# Patient Record
Sex: MALE | Race: BLACK OR AFRICAN AMERICAN | NOT HISPANIC OR LATINO | Employment: FULL TIME | URBAN - METROPOLITAN AREA
[De-identification: names, ages, dates, MRNs, and addresses within clinical notes are randomized per-mention and may not be internally consistent; named-entity substitution may affect disease eponyms.]

---

## 2017-04-03 ENCOUNTER — HOSPITAL ENCOUNTER (EMERGENCY)
Facility: HOSPITAL | Age: 32
Discharge: HOME | End: 2017-04-03
Admitting: EMERGENCY MEDICINE

## 2017-04-03 VITALS
WEIGHT: 107 LBS | RESPIRATION RATE: 16 BRPM | HEART RATE: 93 BPM | OXYGEN SATURATION: 98 % | DIASTOLIC BLOOD PRESSURE: 88 MMHG | HEIGHT: 67 IN | TEMPERATURE: 98.4 F | SYSTOLIC BLOOD PRESSURE: 128 MMHG | BODY MASS INDEX: 16.79 KG/M2

## 2017-04-03 DIAGNOSIS — S86.012A RUPTURE OF LEFT ACHILLES TENDON, INITIAL ENCOUNTER: Primary | ICD-10-CM

## 2017-04-03 PROCEDURE — 99283 EMERGENCY DEPT VISIT LOW MDM: CPT

## 2017-04-03 PROCEDURE — 99282 EMERGENCY DEPT VISIT SF MDM: CPT | Performed by: NURSE PRACTITIONER

## 2017-04-03 RX ORDER — HYDROCODONE BITARTRATE AND ACETAMINOPHEN 5; 325 MG/1; MG/1
1 TABLET ORAL ONCE
Status: COMPLETED | OUTPATIENT
Start: 2017-04-03 | End: 2017-04-03

## 2017-04-03 RX ORDER — HYDROCODONE BITARTRATE AND ACETAMINOPHEN 5; 325 MG/1; MG/1
1 TABLET ORAL EVERY 4 HOURS PRN
Qty: 20 TABLET | Refills: 0 | Status: SHIPPED | OUTPATIENT
Start: 2017-04-03 | End: 2020-11-13

## 2017-04-03 RX ADMIN — HYDROCODONE BITARTRATE AND ACETAMINOPHEN 1 TABLET: 5; 325 TABLET ORAL at 22:23

## 2017-04-04 NOTE — ED PROVIDER NOTES
Subjective   Patient is a 31 y.o. male presenting with lower extremity pain.   History provided by:  Patient   used: No    Lower Extremity Issue   Lower extremity pain location: Left Achilles tendon.  Time since incident:  2 hours  Pain details:     Quality:  Unable to specify    Radiates to:  Does not radiate    Severity:  Moderate    Onset quality:  Sudden    Timing:  Constant    Progression:  Unchanged  Chronicity:  New  Dislocation: no    Foreign body present:  No foreign bodies  Prior injury to area:  No  Relieved by:  Rest  Worsened by:  Bearing weight and activity  Ineffective treatments:  None tried  Associated symptoms: decreased ROM    Associated symptoms: no fever and no swelling        Review of Systems   Constitutional: Positive for activity change. Negative for appetite change, chills, diaphoresis and fever.   HENT: Negative for sore throat and trouble swallowing.    Respiratory: Negative for chest tightness, shortness of breath, wheezing and stridor.    Gastrointestinal: Negative for diarrhea, nausea and vomiting.   Musculoskeletal: Positive for gait problem and myalgias.        Achilles tendon pain   Skin: Negative for color change and rash.   Neurological: Negative for dizziness and headaches.       History reviewed. No pertinent past medical history.    No Known Allergies    Past Surgical History:   Procedure Laterality Date   • ACHILLES TENDON SURGERY         History reviewed. No pertinent family history.    Social History     Social History   • Marital status: Single     Spouse name: N/A   • Number of children: N/A   • Years of education: N/A     Social History Main Topics   • Smoking status: Current Every Day Smoker     Types: Cigars   • Smokeless tobacco: None   • Alcohol use Yes      Comment: socially   • Drug use: None   • Sexual activity: Not Asked     Other Topics Concern   • None     Social History Narrative   • None           Objective   Physical Exam   Constitutional:  He is oriented to person, place, and time. He appears well-developed and well-nourished. No distress.   HENT:   Head: Normocephalic and atraumatic.   Right Ear: External ear normal.   Left Ear: External ear normal.   Nose: Nose normal.   Mouth/Throat: Oropharynx is clear and moist.   Eyes: Conjunctivae are normal. Pupils are equal, round, and reactive to light.   Neck: Neck supple. No tracheal deviation present. No thyromegaly present.   Cardiovascular: Normal rate, regular rhythm, normal heart sounds and intact distal pulses.  Exam reveals no gallop.    No murmur heard.  Pulmonary/Chest: Effort normal and breath sounds normal. No respiratory distress. He has no wheezes. He has no rales.   Abdominal: Soft. Bowel sounds are normal. He exhibits no distension and no mass. There is no tenderness. There is no rebound.   Musculoskeletal:   Tender over the left Achilles tendon.  Flexion-extension of the foot demonstrates the tendon is under just inferior to the gastroc muscle.  Kaufman's test was positive   Lymphadenopathy:     He has no cervical adenopathy.   Neurological: He is alert and oriented to person, place, and time.   Skin: Skin is warm and dry. He is not diaphoretic. No erythema. No pallor.   Psychiatric: He has a normal mood and affect. Judgment and thought content normal.   Nursing note and vitals reviewed.      Procedures         ED Course  ED Course   Comment By Time   Patient presents the ED with complaint of left Achilles tendon pain.  As he heard it pop while he was playing basketball this evening.  Has a history of having a ruptured the right Achilles tendon years ago. NEEMA Boyer 04/03 [id number]                  [facility]    Final diagnoses:   Rupture of left Achilles tendon, initial encounter            NEEMA Boyer  04/03/17 0717

## 2017-04-05 ENCOUNTER — OFFICE VISIT (OUTPATIENT)
Dept: ORTHOPEDIC SURGERY | Facility: CLINIC | Age: 32
End: 2017-04-05

## 2017-04-05 VITALS
SYSTOLIC BLOOD PRESSURE: 145 MMHG | BODY MASS INDEX: 26.68 KG/M2 | WEIGHT: 170 LBS | HEIGHT: 67 IN | DIASTOLIC BLOOD PRESSURE: 85 MMHG | HEART RATE: 69 BPM

## 2017-04-05 DIAGNOSIS — R52 PAIN: Primary | ICD-10-CM

## 2017-04-05 DIAGNOSIS — S86.012A ACHILLES TENDON TEAR, LEFT, INITIAL ENCOUNTER: ICD-10-CM

## 2017-04-05 PROBLEM — S86.019A ACHILLES TENDON TEAR: Status: ACTIVE | Noted: 2017-04-05

## 2017-04-05 PROCEDURE — 73610 X-RAY EXAM OF ANKLE: CPT | Performed by: ORTHOPAEDIC SURGERY

## 2017-04-05 PROCEDURE — 99204 OFFICE O/P NEW MOD 45 MIN: CPT | Performed by: ORTHOPAEDIC SURGERY

## 2017-04-05 RX ORDER — MELOXICAM 7.5 MG/1
7.5 TABLET ORAL DAILY
Qty: 30 TABLET | Refills: 0 | Status: SHIPPED | OUTPATIENT
Start: 2017-04-05 | End: 2020-11-13

## 2017-04-05 RX ORDER — PREGABALIN 25 MG/1
75 CAPSULE ORAL ONCE
Status: CANCELLED | OUTPATIENT
Start: 2017-04-05 | End: 2017-04-05

## 2017-04-05 RX ORDER — OXYCODONE HCL 10 MG/1
10 TABLET, FILM COATED, EXTENDED RELEASE ORAL ONCE
Status: CANCELLED | OUTPATIENT
Start: 2017-04-05 | End: 2017-04-05

## 2017-04-05 RX ORDER — CELECOXIB 100 MG/1
200 CAPSULE ORAL ONCE
Status: CANCELLED | OUTPATIENT
Start: 2017-04-05 | End: 2017-04-05

## 2017-04-05 NOTE — PROGRESS NOTES
Subjective:     Patient ID: Rome Sanchez is a 31 y.o. male.    Chief Complaint:  Left ankle pain  History of Present Illness  Rome Sanchez presents to clinic today for evaluation of left ankle pain and weakness, started after he was playing basketball on April 3, 2017, felt a pop when he went to jump localized the posterior aspect of his left ankle.  Noted immediate onset sharp pain, since it is had moderate aching pain rates as a 7 out of 10.  He was seen in emergency department and diagnosed with an Achilles tendon rupture and recommended for follow-up here.  Denies numbness or tingling left lower extremity.  Using a boot with moderate improvement of pain as well as with elevation.  Using crutches regulation.  Did have significant history of Achilles rupture on the right with repair in 2004, postoperative complication with a wound infection treated with antibiotics.       Social History     Occupational History   • Not on file.     Social History Main Topics   • Smoking status: Current Every Day Smoker     Types: Cigars   • Smokeless tobacco: Never Used   • Alcohol use Yes      Comment: socially   • Drug use: No   • Sexual activity: Defer      History reviewed. No pertinent past medical history.  Past Surgical History:   Procedure Laterality Date   • ACHILLES TENDON SURGERY Right 02/2004       History reviewed. No pertinent family history.      Review of Systems   Constitutional: Positive for chills. Negative for diaphoresis, fever and unexpected weight change.   HENT: Negative for hearing loss, nosebleeds, sore throat and tinnitus.    Eyes: Negative for pain and visual disturbance.   Respiratory: Negative for cough, shortness of breath and wheezing.    Cardiovascular: Negative for chest pain and palpitations.   Gastrointestinal: Negative for abdominal pain, diarrhea, nausea and vomiting.   Endocrine: Negative for cold intolerance, heat intolerance and polydipsia.   Genitourinary: Negative for difficulty  "urinating, dysuria and hematuria.   Musculoskeletal: Positive for joint swelling and myalgias. Negative for arthralgias.   Skin: Negative for rash and wound.   Allergic/Immunologic: Negative for environmental allergies.   Neurological: Negative for dizziness, syncope and numbness.   Hematological: Does not bruise/bleed easily.   Psychiatric/Behavioral: Negative for dysphoric mood and sleep disturbance. The patient is not nervous/anxious.            Objective:  Vitals:    04/05/17 0918   BP: 145/85   BP Location: Left arm   Pulse: 69   Weight: 170 lb (77.1 kg)   Height: 67\" (170.2 cm)     Last 2 weights    04/05/17 0918   Weight: 170 lb (77.1 kg)     Body mass index is 26.63 kg/(m^2).  Physical Exam    Vital signs reviewed.   General: No acute distress.  Eyes: conjunctiva clear; pupils equally round and reactive  ENT: external ears and nose atraumatic; oropharynx clear  CV: no peripheral edema  Resp: normal respiratory effort  Skin: no rashes or wounds; normal turgor  Psych: mood and affect appropriate; recent and remote memory intact       Ortho Exam    Left ankle-palpable defect over the Achilles tendon noted with maximal tenderness palpation in this region, approximately 6 cm above the insertion of the Achilles onto the calcaneus.  Positive Kaufman test, no active plantarflexion appreciated.  Patient is able to dorsiflex to 5°, limited secondary to pain.  Positive flexion extension all toes left foot with 4 out of 5 strength.  Positive sensation light touch medial, lateral, plantar, dorsal first with his left foot symmetric to the right.  Moderate calf Pain, no significant calf swelling.  Brisk cap refill all digits, 2+ dorsalis pedis pulse left foot    Imaging:  Left Ankle X-Ray  Indication: Pain  Views: AP, Lateral, Mortise  Findings:  No fracture  No bony lesion  Soft tissues with shadowing suggesting Achilles tendon rupture on lateral view, no evidence of bony avulsion  Normal joint spaces with mortise " well-aligned, no evidence of syndesmosis widening    No prior studies available for comparison.      Assessment:       1. Pain    2. Achilles tendon tear, left, initial encounter          Plan:  SAUL query complete.          Discussed treatment options at length with patient at today's visit. Given his desire to return to activities and prior success with Achilles tendon repair on the right he would like to proceed with Achilles tendon repair on the left.  We did discuss possibility for nonoperative treatment and noted limitation with that including a slightly higher risk of rerupture but also noted complications possible with surgical treatment including wound infection as he did have on the right.  In light of these risks, he still wishes to proceed with surgical treatment at this time.    We will plan on proceeding with surgery at patient's request for left Achilles tendon repair and all associated procedures. I reviewed details of procedure with patient today and discussed risks, benefits, alternatives, and limitations of the procedure in laymen's terms with the risks including but not limited to: neurovascular damage, bleeding, infection, wound dehiscence, chronic pain, worsening of pain, re-rupture of Achilles, swelling, loss of motion, weakness, stiffness, instability, DVT, pulmonary embolus, death, stroke, complex regional pain syndrome, and need for additional procedures. No guarantees were given regarding results of surgery.      Patient was given the opportunity to ask and have all questions answered today. The patient voiced understanding of the risks, benefits, and alternative forms of treatment that were discussed and the patient consents to proceed with surgery.         Rome Sanchez was in agreement with plan and had all questions answered.     Orders:  Orders Placed This Encounter   Procedures   • XR Ankle 3+ View Left   • Basic metabolic panel   • Protime-INR   • APTT       Medications:  New  Medications Ordered This Visit   Medications   • meloxicam (MOBIC) 7.5 MG tablet     Sig: Take 1 tablet by mouth Daily.     Dispense:  30 tablet     Refill:  0       Followup:  No Follow-up on file.          Dragon transcription disclaimer     Much of this encounter note is an electronic transcription/translation of spoken language to printed text. The electronic translation of spoken language may permit erroneous, or at times, nonsensical words or phrases to be inadvertently transcribed. Although I have reviewed the note for such errors, some may still exist.

## 2017-04-07 ENCOUNTER — TELEPHONE (OUTPATIENT)
Dept: ORTHOPEDIC SURGERY | Facility: CLINIC | Age: 32
End: 2017-04-07

## 2017-04-07 RX ORDER — DICLOFENAC SODIUM 75 MG/1
75 TABLET, DELAYED RELEASE ORAL 2 TIMES DAILY
Qty: 30 TABLET | Refills: 0 | Status: SHIPPED | OUTPATIENT
Start: 2017-04-07 | End: 2020-11-13

## 2017-04-07 NOTE — TELEPHONE ENCOUNTER
Patient calling requesting an RX for pain.  Rupture of left Achilles tendon-last seen 04.05.2017 with surgery pending due to no insurance.    Thanks.

## 2020-11-13 ENCOUNTER — OFFICE VISIT (OUTPATIENT)
Dept: FAMILY MEDICINE CLINIC | Facility: CLINIC | Age: 35
End: 2020-11-13

## 2020-11-13 VITALS
HEART RATE: 70 BPM | WEIGHT: 168.8 LBS | TEMPERATURE: 98.4 F | BODY MASS INDEX: 26.49 KG/M2 | OXYGEN SATURATION: 95 % | HEIGHT: 67 IN | DIASTOLIC BLOOD PRESSURE: 93 MMHG | SYSTOLIC BLOOD PRESSURE: 149 MMHG
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
## 2020-11-13 DIAGNOSIS — S02.91XA CLOSED FRACTURE OF SKULL, UNSPECIFIED BONE, INITIAL ENCOUNTER (HCC): Primary | ICD-10-CM

## 2020-11-13 PROCEDURE — 99204 OFFICE O/P NEW MOD 45 MIN: CPT | Performed by: FAMILY MEDICINE

## 2020-11-13 RX ORDER — IBUPROFEN 600 MG/1
600 TABLET ORAL EVERY 6 HOURS PRN
COMMUNITY
End: 2020-11-13

## 2020-11-13 RX ORDER — IBUPROFEN 800 MG/1
800 TABLET ORAL EVERY 6 HOURS PRN
Qty: 50 TABLET | Refills: 1 | Status: SHIPPED | OUTPATIENT
Start: 2020-11-13 | End: 2020-12-02 | Stop reason: SDUPTHER

## 2020-11-13 RX ORDER — METHOCARBAMOL 500 MG/1
500 TABLET, FILM COATED ORAL 4 TIMES DAILY
COMMUNITY

## 2020-11-13 RX ORDER — HYDROCODONE BITARTRATE AND ACETAMINOPHEN 5; 325 MG/1; MG/1
1 TABLET ORAL EVERY 4 HOURS PRN
Qty: 12 TABLET | Refills: 0 | Status: SHIPPED | OUTPATIENT
Start: 2020-11-13
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
## 2020-11-20 ENCOUNTER — OFFICE VISIT (OUTPATIENT)
Dept: FAMILY MEDICINE CLINIC | Facility: CLINIC | Age: 35
End: 2020-11-20

## 2020-11-20 VITALS
TEMPERATURE: 97.8 F | HEART RATE: 58 BPM | RESPIRATION RATE: 16 BRPM | HEIGHT: 67 IN | SYSTOLIC BLOOD PRESSURE: 131 MMHG | WEIGHT: 173.6 LBS | BODY MASS INDEX: 27.25 KG/M2 | DIASTOLIC BLOOD PRESSURE: 83 MMHG | OXYGEN SATURATION: 100 %

## 2020-11-20 DIAGNOSIS — M89.8X1 CLAVICLE PAIN: Primary | ICD-10-CM

## 2020-11-20 DIAGNOSIS — Z48.02 VISIT FOR SUTURE REMOVAL: ICD-10-CM
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
## 2020-11-20 DIAGNOSIS — S02.91XD CLOSED FRACTURE OF SKULL WITH ROUTINE HEALING, UNSPECIFIED BONE, SUBSEQUENT ENCOUNTER: ICD-10-CM

## 2020-11-20 PROCEDURE — 99213 OFFICE O/P EST LOW 20 MIN: CPT | Performed by: FAMILY MEDICINE
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
## 2020-11-30 ENCOUNTER — TELEPHONE (OUTPATIENT)
Dept: FAMILY MEDICINE CLINIC | Facility: CLINIC | Age: 35
End: 2020-11-30

## 2020-12-02 ENCOUNTER — TELEMEDICINE (OUTPATIENT)
Dept: FAMILY MEDICINE CLINIC | Facility: CLINIC | Age: 35
End: 2020-12-02

## 2020-12-02 DIAGNOSIS — H54.7 VISION LOSS: ICD-10-CM

## 2020-12-02 DIAGNOSIS — S02.91XD CLOSED FRACTURE OF SKULL WITH ROUTINE HEALING, UNSPECIFIED BONE, SUBSEQUENT ENCOUNTER: Primary | ICD-10-CM
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
## 2020-12-02 PROCEDURE — 99213 OFFICE O/P EST LOW 20 MIN: CPT | Performed by: FAMILY MEDICINE

## 2020-12-02 RX ORDER — IBUPROFEN 800 MG/1
800 TABLET ORAL EVERY 6 HOURS PRN
Qty: 90 TABLET | Refills: 1 | Status: SHIPPED | OUTPATIENT
Start: 2020-12-02
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
## 2020-12-04 ENCOUNTER — OFFICE VISIT (OUTPATIENT)
Dept: SPORTS MEDICINE | Facility: CLINIC | Age: 35
End: 2020-12-04

## 2020-12-04 VITALS
DIASTOLIC BLOOD PRESSURE: 85 MMHG | HEART RATE: 94 BPM | OXYGEN SATURATION: 97 % | BODY MASS INDEX: 27.15 KG/M2 | HEIGHT: 67 IN | WEIGHT: 173 LBS | SYSTOLIC BLOOD PRESSURE: 128 MMHG | TEMPERATURE: 97.8 F | RESPIRATION RATE: 15 BRPM

## 2020-12-04 DIAGNOSIS — M25.512 ACUTE PAIN OF LEFT SHOULDER: ICD-10-CM

## 2020-12-04 DIAGNOSIS — S43.62XA STERNOCLAVICULAR SPRAIN, LEFT, INITIAL ENCOUNTER: ICD-10-CM
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
## 2020-12-04 PROCEDURE — 73000 X-RAY EXAM OF COLLAR BONE: CPT | Performed by: FAMILY MEDICINE

## 2020-12-04 PROCEDURE — 99244 OFF/OP CNSLTJ NEW/EST MOD 40: CPT | Performed by: FAMILY MEDICINE

## 2020-12-04 RX ORDER — CYCLOBENZAPRINE HCL 10 MG
10 TABLET ORAL 2 TIMES DAILY
COMMUNITY
Start: 2020-11-17
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
## 2023-09-29 ENCOUNTER — HOSPITAL ENCOUNTER (INPATIENT)
Facility: HOSPITAL | Age: 38
LOS: 2 days | Discharge: HOME | DRG: 440 | End: 2023-10-01
Attending: EMERGENCY MEDICINE | Admitting: INTERNAL MEDICINE

## 2023-09-29 ENCOUNTER — APPOINTMENT (OUTPATIENT)
Dept: CT IMAGING | Facility: HOSPITAL | Age: 38
DRG: 440 | End: 2023-09-29

## 2023-09-29 DIAGNOSIS — K85.20 ALCOHOL-INDUCED ACUTE PANCREATITIS, UNSPECIFIED COMPLICATION STATUS: Primary | ICD-10-CM

## 2023-09-29 PROBLEM — F10.10 ALCOHOL ABUSE: Status: ACTIVE | Noted: 2023-09-29

## 2023-09-29 PROBLEM — K85.90 ACUTE PANCREATITIS: Status: ACTIVE | Noted: 2023-09-29

## 2023-09-29 PROBLEM — F17.290 CIGAR SMOKER: Status: ACTIVE | Noted: 2023-09-29

## 2023-09-29 PROBLEM — F12.20 TETRAHYDROCANNABINOL (THC) USE DISORDER, MODERATE, DEPENDENCE: Status: ACTIVE | Noted: 2023-09-29

## 2023-09-29 LAB
ALBUMIN SERPL-MCNC: 4.2 G/DL (ref 3.5–5.2)
ALBUMIN/GLOB SERPL: 1.4 G/DL
ALP SERPL-CCNC: 61 U/L (ref 39–117)
ALT SERPL W P-5'-P-CCNC: 9 U/L (ref 1–41)
AMPHET+METHAMPHET UR QL: NEGATIVE
AMYLASE SERPL-CCNC: 123 U/L (ref 28–100)
ANION GAP SERPL CALCULATED.3IONS-SCNC: 15 MMOL/L (ref 5–15)
AST SERPL-CCNC: 28 U/L (ref 1–40)
BARBITURATES UR QL SCN: NEGATIVE
BASOPHILS # BLD AUTO: 0.02 10*3/MM3 (ref 0–0.2)
BASOPHILS NFR BLD AUTO: 0.1 % (ref 0–1.5)
BENZODIAZ UR QL SCN: NEGATIVE
BILIRUB SERPL-MCNC: 0.7 MG/DL (ref 0–1.2)
BILIRUB UR QL STRIP: NEGATIVE
BUN SERPL-MCNC: 9 MG/DL (ref 6–20)
BUN/CREAT SERPL: 7.8 (ref 7–25)
CALCIUM SPEC-SCNC: 9.6 MG/DL (ref 8.6–10.5)
CANNABINOIDS SERPL QL: POSITIVE
CHLORIDE SERPL-SCNC: 100 MMOL/L (ref 98–107)
CHOLEST SERPL-MCNC: 18 MG/DL (ref 0–200)
CLARITY UR: CLEAR
CO2 SERPL-SCNC: 21 MMOL/L (ref 22–29)
COCAINE UR QL: NEGATIVE
COLOR UR: YELLOW
CREAT SERPL-MCNC: 1.15 MG/DL (ref 0.76–1.27)
D-LACTATE SERPL-SCNC: 2 MMOL/L (ref 0.5–2)
DEPRECATED RDW RBC AUTO: 45.3 FL (ref 37–54)
EGFRCR SERPLBLD CKD-EPI 2021: 82.5 ML/MIN/1.73
EOSINOPHIL # BLD AUTO: 0.04 10*3/MM3 (ref 0–0.4)
EOSINOPHIL NFR BLD AUTO: 0.3 % (ref 0.3–6.2)
ERYTHROCYTE [DISTWIDTH] IN BLOOD BY AUTOMATED COUNT: 13.1 % (ref 12.3–15.4)
ETHANOL BLD-MCNC: <10 MG/DL (ref 0–10)
ETHANOL UR QL: <0.01 %
FENTANYL UR-MCNC: NEGATIVE NG/ML
GLOBULIN UR ELPH-MCNC: 3.1 GM/DL
GLUCOSE SERPL-MCNC: 128 MG/DL (ref 65–99)
GLUCOSE UR STRIP-MCNC: NEGATIVE MG/DL
HCT VFR BLD AUTO: 47.3 % (ref 37.5–51)
HDLC SERPL-MCNC: 5 MG/DL (ref 40–60)
HGB BLD-MCNC: 16.6 G/DL (ref 13–17.7)
HGB UR QL STRIP.AUTO: NEGATIVE
HOLD SPECIMEN: NORMAL
HOLD SPECIMEN: NORMAL
IMM GRANULOCYTES # BLD AUTO: 0.04 10*3/MM3 (ref 0–0.05)
IMM GRANULOCYTES NFR BLD AUTO: 0.3 % (ref 0–0.5)
KETONES UR QL STRIP: ABNORMAL
LDLC SERPL CALC-MCNC: <5 MG/DL (ref 0–100)
LDLC/HDLC SERPL: 1.8 {RATIO}
LEUKOCYTE ESTERASE UR QL STRIP.AUTO: NEGATIVE
LIPASE SERPL-CCNC: 205 U/L (ref 13–60)
LYMPHOCYTES # BLD AUTO: 2.14 10*3/MM3 (ref 0.7–3.1)
LYMPHOCYTES NFR BLD AUTO: 15.9 % (ref 19.6–45.3)
MAGNESIUM SERPL-MCNC: 2.1 MG/DL (ref 1.6–2.6)
MCH RBC QN AUTO: 33.3 PG (ref 26.6–33)
MCHC RBC AUTO-ENTMCNC: 35.1 G/DL (ref 31.5–35.7)
MCV RBC AUTO: 94.8 FL (ref 79–97)
METHADONE UR QL SCN: NEGATIVE
MONOCYTES # BLD AUTO: 1.07 10*3/MM3 (ref 0.1–0.9)
MONOCYTES NFR BLD AUTO: 8 % (ref 5–12)
NEUTROPHILS NFR BLD AUTO: 10.13 10*3/MM3 (ref 1.7–7)
NEUTROPHILS NFR BLD AUTO: 75.4 % (ref 42.7–76)
NITRITE UR QL STRIP: NEGATIVE
NRBC BLD AUTO-RTO: 0 /100 WBC (ref 0–0.2)
OPIATES UR QL: POSITIVE
OXYCODONE UR QL SCN: NEGATIVE
PH UR STRIP.AUTO: 8.5 [PH] (ref 5–8)
PLATELET # BLD AUTO: 186 10*3/MM3 (ref 140–450)
PMV BLD AUTO: 11.2 FL (ref 6–12)
POTASSIUM SERPL-SCNC: 4.9 MMOL/L (ref 3.5–5.2)
PROT SERPL-MCNC: 7.3 G/DL (ref 6–8.5)
PROT UR QL STRIP: ABNORMAL
RBC # BLD AUTO: 4.99 10*6/MM3 (ref 4.14–5.8)
SODIUM SERPL-SCNC: 136 MMOL/L (ref 136–145)
SP GR UR STRIP: 1.02 (ref 1–1.03)
TRIGL SERPL-MCNC: 20 MG/DL (ref 0–150)
UROBILINOGEN UR QL STRIP: ABNORMAL
VLDLC SERPL-MCNC: ABNORMAL MG/DL
WBC NRBC COR # BLD: 13.44 10*3/MM3 (ref 3.4–10.8)
WHOLE BLOOD HOLD COAG: NORMAL
WHOLE BLOOD HOLD SPECIMEN: NORMAL

## 2023-09-29 PROCEDURE — 82150 ASSAY OF AMYLASE: CPT | Performed by: NURSE PRACTITIONER

## 2023-09-29 PROCEDURE — 25010000002 ONDANSETRON PER 1 MG: Performed by: EMERGENCY MEDICINE

## 2023-09-29 PROCEDURE — 80307 DRUG TEST PRSMV CHEM ANLYZR: CPT | Performed by: PHYSICIAN ASSISTANT

## 2023-09-29 PROCEDURE — 83690 ASSAY OF LIPASE: CPT | Performed by: EMERGENCY MEDICINE

## 2023-09-29 PROCEDURE — 82077 ASSAY SPEC XCP UR&BREATH IA: CPT | Performed by: PHYSICIAN ASSISTANT

## 2023-09-29 PROCEDURE — 25010000002 DROPERIDOL PER 5 MG: Performed by: EMERGENCY MEDICINE

## 2023-09-29 PROCEDURE — 80053 COMPREHEN METABOLIC PANEL: CPT | Performed by: EMERGENCY MEDICINE

## 2023-09-29 PROCEDURE — 85025 COMPLETE CBC W/AUTO DIFF WBC: CPT | Performed by: EMERGENCY MEDICINE

## 2023-09-29 PROCEDURE — 25010000002 DIPHENHYDRAMINE PER 50 MG: Performed by: EMERGENCY MEDICINE

## 2023-09-29 PROCEDURE — 25010000002 MORPHINE PER 10 MG: Performed by: EMERGENCY MEDICINE

## 2023-09-29 PROCEDURE — 81003 URINALYSIS AUTO W/O SCOPE: CPT | Performed by: EMERGENCY MEDICINE

## 2023-09-29 PROCEDURE — 25010000002 LORAZEPAM PER 2 MG: Performed by: NURSE PRACTITIONER

## 2023-09-29 PROCEDURE — 80061 LIPID PANEL: CPT | Performed by: NURSE PRACTITIONER

## 2023-09-29 PROCEDURE — 36415 COLL VENOUS BLD VENIPUNCTURE: CPT | Performed by: NURSE PRACTITIONER

## 2023-09-29 PROCEDURE — 87040 BLOOD CULTURE FOR BACTERIA: CPT | Performed by: NURSE PRACTITIONER

## 2023-09-29 PROCEDURE — 99285 EMERGENCY DEPT VISIT HI MDM: CPT

## 2023-09-29 PROCEDURE — 83735 ASSAY OF MAGNESIUM: CPT | Performed by: EMERGENCY MEDICINE

## 2023-09-29 PROCEDURE — 74177 CT ABD & PELVIS W/CONTRAST: CPT

## 2023-09-29 PROCEDURE — 83605 ASSAY OF LACTIC ACID: CPT | Performed by: NURSE PRACTITIONER

## 2023-09-29 PROCEDURE — 25010000002 THIAMINE HCL 200 MG/2ML SOLUTION: Performed by: NURSE PRACTITIONER

## 2023-09-29 PROCEDURE — 25510000001 IOPAMIDOL 61 % SOLUTION: Performed by: EMERGENCY MEDICINE

## 2023-09-29 RX ORDER — DROPERIDOL 2.5 MG/ML
2.5 INJECTION, SOLUTION INTRAMUSCULAR; INTRAVENOUS ONCE
Status: DISCONTINUED | OUTPATIENT
Start: 2023-09-29 | End: 2023-09-29

## 2023-09-29 RX ORDER — LORAZEPAM 2 MG/ML
1 INJECTION INTRAMUSCULAR
Status: DISCONTINUED | OUTPATIENT
Start: 2023-09-29 | End: 2023-10-01 | Stop reason: HOSPADM

## 2023-09-29 RX ORDER — LORAZEPAM 2 MG/ML
2 INJECTION INTRAMUSCULAR
Status: DISCONTINUED | OUTPATIENT
Start: 2023-09-29 | End: 2023-10-01 | Stop reason: HOSPADM

## 2023-09-29 RX ORDER — DROPERIDOL 2.5 MG/ML
1.25 INJECTION, SOLUTION INTRAMUSCULAR; INTRAVENOUS ONCE
Status: COMPLETED | OUTPATIENT
Start: 2023-09-29 | End: 2023-09-29

## 2023-09-29 RX ORDER — CLONIDINE HYDROCHLORIDE 0.1 MG/1
0.1 TABLET ORAL EVERY 6 HOURS SCHEDULED
Status: DISCONTINUED | OUTPATIENT
Start: 2023-09-29 | End: 2023-10-01 | Stop reason: HOSPADM

## 2023-09-29 RX ORDER — LORAZEPAM 1 MG/1
2 TABLET ORAL
Status: DISCONTINUED | OUTPATIENT
Start: 2023-09-29 | End: 2023-10-01 | Stop reason: HOSPADM

## 2023-09-29 RX ORDER — THIAMINE HYDROCHLORIDE 100 MG/ML
200 INJECTION, SOLUTION INTRAMUSCULAR; INTRAVENOUS EVERY 8 HOURS SCHEDULED
Status: DISCONTINUED | OUTPATIENT
Start: 2023-09-29 | End: 2023-10-01 | Stop reason: HOSPADM

## 2023-09-29 RX ORDER — SODIUM CHLORIDE 0.9 % (FLUSH) 0.9 %
10 SYRINGE (ML) INJECTION AS NEEDED
Status: DISCONTINUED | OUTPATIENT
Start: 2023-09-29 | End: 2023-10-01 | Stop reason: HOSPADM

## 2023-09-29 RX ORDER — ONDANSETRON 2 MG/ML
4 INJECTION INTRAMUSCULAR; INTRAVENOUS ONCE
Status: COMPLETED | OUTPATIENT
Start: 2023-09-29 | End: 2023-09-29

## 2023-09-29 RX ORDER — FOLIC ACID 1 MG/1
1 TABLET ORAL DAILY
Status: DISCONTINUED | OUTPATIENT
Start: 2023-09-29 | End: 2023-10-01 | Stop reason: HOSPADM

## 2023-09-29 RX ORDER — SODIUM CHLORIDE 9 MG/ML
150 INJECTION, SOLUTION INTRAVENOUS CONTINUOUS
Status: DISCONTINUED | OUTPATIENT
Start: 2023-09-29 | End: 2023-10-01

## 2023-09-29 RX ORDER — FAMOTIDINE 10 MG/ML
20 INJECTION, SOLUTION INTRAVENOUS ONCE
Status: COMPLETED | OUTPATIENT
Start: 2023-09-29 | End: 2023-09-29

## 2023-09-29 RX ORDER — LORAZEPAM 1 MG/1
2 TABLET ORAL EVERY 6 HOURS
Status: DISCONTINUED | OUTPATIENT
Start: 2023-09-29 | End: 2023-09-29

## 2023-09-29 RX ORDER — MORPHINE SULFATE 2 MG/ML
4 INJECTION, SOLUTION INTRAMUSCULAR; INTRAVENOUS ONCE
Status: COMPLETED | OUTPATIENT
Start: 2023-09-29 | End: 2023-09-29

## 2023-09-29 RX ORDER — LORAZEPAM 1 MG/1
1 TABLET ORAL EVERY 6 HOURS
Status: DISCONTINUED | OUTPATIENT
Start: 2023-09-30 | End: 2023-09-29

## 2023-09-29 RX ORDER — LORAZEPAM 1 MG/1
1 TABLET ORAL
Status: DISCONTINUED | OUTPATIENT
Start: 2023-09-29 | End: 2023-10-01 | Stop reason: HOSPADM

## 2023-09-29 RX ORDER — DIPHENHYDRAMINE HYDROCHLORIDE 50 MG/ML
25 INJECTION INTRAMUSCULAR; INTRAVENOUS ONCE
Status: COMPLETED | OUTPATIENT
Start: 2023-09-29 | End: 2023-09-29

## 2023-09-29 RX ADMIN — FOLIC ACID 1 MG: 1 TABLET ORAL at 09:54

## 2023-09-29 RX ADMIN — MORPHINE SULFATE 4 MG: 2 INJECTION, SOLUTION INTRAMUSCULAR; INTRAVENOUS at 06:40

## 2023-09-29 RX ADMIN — DROPERIDOL 1.25 MG: 2.5 INJECTION, SOLUTION INTRAMUSCULAR; INTRAVENOUS at 07:07

## 2023-09-29 RX ADMIN — CLONIDINE HYDROCHLORIDE 0.1 MG: 0.1 TABLET ORAL at 17:31

## 2023-09-29 RX ADMIN — SODIUM CHLORIDE 1000 ML: 9 INJECTION, SOLUTION INTRAVENOUS at 06:35

## 2023-09-29 RX ADMIN — DROPERIDOL 1.25 MG: 2.5 INJECTION, SOLUTION INTRAMUSCULAR; INTRAVENOUS at 09:03

## 2023-09-29 RX ADMIN — SODIUM CHLORIDE 150 ML/HR: 9 INJECTION, SOLUTION INTRAVENOUS at 10:02

## 2023-09-29 RX ADMIN — FAMOTIDINE 20 MG: 10 INJECTION INTRAVENOUS at 06:58

## 2023-09-29 RX ADMIN — ONDANSETRON 4 MG: 2 INJECTION INTRAMUSCULAR; INTRAVENOUS at 06:41

## 2023-09-29 RX ADMIN — IOPAMIDOL 85 ML: 612 INJECTION, SOLUTION INTRAVENOUS at 08:23

## 2023-09-29 RX ADMIN — LORAZEPAM 1 MG: 2 INJECTION INTRAMUSCULAR; INTRAVENOUS at 22:02

## 2023-09-29 RX ADMIN — THIAMINE HYDROCHLORIDE 200 MG: 100 INJECTION, SOLUTION INTRAMUSCULAR; INTRAVENOUS at 16:26

## 2023-09-29 RX ADMIN — DIPHENHYDRAMINE HYDROCHLORIDE 25 MG: 50 INJECTION, SOLUTION INTRAMUSCULAR; INTRAVENOUS at 07:20

## 2023-09-29 RX ADMIN — LORAZEPAM 2 MG: 1 TABLET ORAL at 09:55

## 2023-09-29 RX ADMIN — SODIUM CHLORIDE 150 ML/HR: 9 INJECTION, SOLUTION INTRAVENOUS at 17:31

## 2023-09-29 NOTE — ED PROVIDER NOTES
EMERGENCY DEPARTMENT ENCOUNTER    Room Number:  E456/1  Date of encounter:  9/29/2023  PCP: Charbel Kaur MD  Historian: Patient and friend  Relevant information and history provided by sources other than the patient will be included below and in the ED Course.  Review of pertinent past medical records may also be included in record below and ED Course.    HPI:  Chief Complaint: Abdominal pain  A complete HPI/ROS/PMH/PSH/SH/FH are unobtainable due to: Not applicable  Context: Rome Sanchez is a 38 y.o. male who presents to the ED c/o this patient's abdominal pain started about 3 days ago.  Uncertain of the exact times a day but has been going on for a few days.  It is mainly in the upper abdomen but he states it is all over and it is greater on the right side.  He states it feels like a pressure.  The pain has been constant.  It waxes and wanes in severity but is been getting worse and brought him here to the emergency department.  He has had some nausea and vomiting that just started here in the emergency department.  He reports no definitive fever.  He states the pain is worse when he eats and drinks.  Denies any diarrhea.  He has never had surgery on his abdomen or pelvis.  He denies chest pain or shortness of breath.  He feels some numbness and tingling in his upper extremities mainly in his hands is where he feels the numbness and tingling.  He does not have any chronic medical problems and does not take any medicines on a regular basis.  Patient does drink alcohol and smoke marijuana on a daily basis.      Previous Episodes: No  Current Symptoms: See above    MEDICAL HISTORY REVIEWED  I reviewed records and [facility].  Associate this gentleman has any significant past history.  He has had a history of urethritis and ruptured Achilles tendon in the distant past.  Also history of postconcussive syndrome.      PAST MEDICAL HISTORY  Active Ambulatory Problems     Diagnosis Date Noted    Achilles tendon tear  04/05/2017     Resolved Ambulatory Problems     Diagnosis Date Noted    Pain 04/05/2017     No Additional Past Medical History         PAST SURGICAL HISTORY  Past Surgical History:   Procedure Laterality Date    ACHILLES TENDON SURGERY Right 02/2004         FAMILY HISTORY  History reviewed. No pertinent family history.      SOCIAL HISTORY  Social History     Socioeconomic History    Marital status: Single   Tobacco Use    Smoking status: Every Day     Types: Cigars    Smokeless tobacco: Never   Vaping Use    Vaping Use: Some days    Substances: THC    Devices: Pre-filled or refillable cartridge   Substance and Sexual Activity    Alcohol use: Yes     Comment: socially    Drug use: No    Sexual activity: Defer         ALLERGIES  Patient has no known allergies.        REVIEW OF SYSTEMS  Review of Systems     All systems reviewed and negative except for those discussed in HPI.       PHYSICAL EXAM    I have reviewed the triage vital signs and nursing notes.    ED Triage Vitals   Temp Heart Rate Resp BP SpO2   09/29/23 0557 09/29/23 0557 09/29/23 0557 09/29/23 0604 09/29/23 0557   96.3 °F (35.7 °C) 56 18 158/93 99 %      Temp src Heart Rate Source Patient Position BP Location FiO2 (%)   09/29/23 0557 -- -- -- --   Tympanic           GENERAL: This patient looks uncomfortable.  No acute cardiovascular respiratory distress.Vital signs on my initial evaluation have been reviewed.  His O2 sats 100% on room air.  Blood pressure is normal.  Heart rate is normal  HENT: nares patent  Head/neck/ face are symmetric without gross deformity, signs of trauma, or swelling  EYES: no scleral icterus, no conjunctival pallor.  NECK: Supple, no meningismus  CV: regular rhythm, regular rate with intact distal pulses.  RESPIRATORY: normal effort and no respiratory distress.  Clear to auscultation bilaterally  ABDOMEN: Patient has reproducible abdominal pain mostly in the midepigastric and right side of his belly.  Right upper and mid quadrant.   His belly is soft but has some voluntary guarding.  Positive bowel sounds  MUSCULOSKELETAL: no deformity.  No edema.  Intact distal pulses to upper and lower extremities are equal strong and symmetric.  There is no coolness or cyanosis.  No pallor  NEURO: alert and appropriate, moves all extremities, follows commands.  No focal motor or sensory changes  SKIN: warm, dry    Vital signs and nursing notes reviewed.        LAB RESULTS  Recent Results (from the past 24 hour(s))   Green Top (Gel)    Collection Time: 09/29/23  6:35 AM   Result Value Ref Range    Extra Tube Hold for add-ons.    Lavender Top    Collection Time: 09/29/23  6:35 AM   Result Value Ref Range    Extra Tube hold for add-on    Gold Top - SST    Collection Time: 09/29/23  6:35 AM   Result Value Ref Range    Extra Tube Hold for add-ons.    Light Blue Top    Collection Time: 09/29/23  6:35 AM   Result Value Ref Range    Extra Tube Hold for add-ons.    Comprehensive Metabolic Panel    Collection Time: 09/29/23  6:35 AM    Specimen: Blood   Result Value Ref Range    Glucose 128 (H) 65 - 99 mg/dL    BUN 9 6 - 20 mg/dL    Creatinine 1.15 0.76 - 1.27 mg/dL    Sodium 136 136 - 145 mmol/L    Potassium 4.9 3.5 - 5.2 mmol/L    Chloride 100 98 - 107 mmol/L    CO2 21.0 (L) 22.0 - 29.0 mmol/L    Calcium 9.6 8.6 - 10.5 mg/dL    Total Protein 7.3 6.0 - 8.5 g/dL    Albumin 4.2 3.5 - 5.2 g/dL    ALT (SGPT) 9 1 - 41 U/L    AST (SGOT) 28 1 - 40 U/L    Alkaline Phosphatase 61 39 - 117 U/L    Total Bilirubin 0.7 0.0 - 1.2 mg/dL    Globulin 3.1 gm/dL    A/G Ratio 1.4 g/dL    BUN/Creatinine Ratio 7.8 7.0 - 25.0    Anion Gap 15.0 5.0 - 15.0 mmol/L    eGFR 82.5 >60.0 mL/min/1.73   CBC Auto Differential    Collection Time: 09/29/23  6:35 AM    Specimen: Blood   Result Value Ref Range    WBC 13.44 (H) 3.40 - 10.80 10*3/mm3    RBC 4.99 4.14 - 5.80 10*6/mm3    Hemoglobin 16.6 13.0 - 17.7 g/dL    Hematocrit 47.3 37.5 - 51.0 %    MCV 94.8 79.0 - 97.0 fL    MCH 33.3 (H) 26.6 - 33.0  pg    MCHC 35.1 31.5 - 35.7 g/dL    RDW 13.1 12.3 - 15.4 %    RDW-SD 45.3 37.0 - 54.0 fl    MPV 11.2 6.0 - 12.0 fL    Platelets 186 140 - 450 10*3/mm3    Neutrophil % 75.4 42.7 - 76.0 %    Lymphocyte % 15.9 (L) 19.6 - 45.3 %    Monocyte % 8.0 5.0 - 12.0 %    Eosinophil % 0.3 0.3 - 6.2 %    Basophil % 0.1 0.0 - 1.5 %    Immature Grans % 0.3 0.0 - 0.5 %    Neutrophils, Absolute 10.13 (H) 1.70 - 7.00 10*3/mm3    Lymphocytes, Absolute 2.14 0.70 - 3.10 10*3/mm3    Monocytes, Absolute 1.07 (H) 0.10 - 0.90 10*3/mm3    Eosinophils, Absolute 0.04 0.00 - 0.40 10*3/mm3    Basophils, Absolute 0.02 0.00 - 0.20 10*3/mm3    Immature Grans, Absolute 0.04 0.00 - 0.05 10*3/mm3    nRBC 0.0 0.0 - 0.2 /100 WBC   Ethanol    Collection Time: 09/29/23  6:35 AM    Specimen: Blood   Result Value Ref Range    Ethanol <10 0 - 10 mg/dL    Ethanol % <0.010 %   Magnesium    Collection Time: 09/29/23  6:35 AM    Specimen: Blood   Result Value Ref Range    Magnesium 2.1 1.6 - 2.6 mg/dL   Urinalysis With Microscopic If Indicated (No Culture) - Urine, Clean Catch    Collection Time: 09/29/23  9:00 AM    Specimen: Urine, Clean Catch   Result Value Ref Range    Color, UA Yellow Yellow, Straw    Appearance, UA Clear Clear    pH, UA 8.5 (H) 5.0 - 8.0    Specific Gravity, UA 1.025 1.005 - 1.030    Glucose, UA Negative Negative    Ketones, UA 15 mg/dL (1+) (A) Negative    Bilirubin, UA Negative Negative    Blood, UA Negative Negative    Protein, UA Trace (A) Negative    Leuk Esterase, UA Negative Negative    Nitrite, UA Negative Negative    Urobilinogen, UA 1.0 E.U./dL 0.2 - 1.0 E.U./dL   Urine Drug Screen - Urine, Clean Catch    Collection Time: 09/29/23  9:00 AM    Specimen: Urine, Clean Catch   Result Value Ref Range    Amphet/Methamphet, Screen Negative Negative    Barbiturates Screen, Urine Negative Negative    Benzodiazepine Screen, Urine Negative Negative    Cocaine Screen, Urine Negative Negative    Opiate Screen Positive (A) Negative    THC,  Screen, Urine Positive (A) Negative    Methadone Screen, Urine Negative Negative    Oxycodone Screen, Urine Negative Negative    Fentanyl, Urine Negative Negative   Lipase    Collection Time: 09/29/23  9:01 AM    Specimen: Blood   Result Value Ref Range    Lipase 205 (H) 13 - 60 U/L   Lipid Panel    Collection Time: 09/29/23  9:01 AM    Specimen: Blood   Result Value Ref Range    Total Cholesterol 18 0 - 200 mg/dL    Triglycerides 20 0 - 150 mg/dL    HDL Cholesterol 5 (L) 40 - 60 mg/dL    LDL Cholesterol  <5 0 - 100 mg/dL    VLDL Cholesterol      LDL/HDL Ratio 1.80    Amylase    Collection Time: 09/29/23  9:01 AM    Specimen: Blood   Result Value Ref Range    Amylase 123 (H) 28 - 100 U/L   Lactic Acid, Plasma    Collection Time: 09/29/23 12:36 PM    Specimen: Blood   Result Value Ref Range    Lactate 2.0 0.5 - 2.0 mmol/L       Ordered the above labs and independently reviewed the results.        RADIOLOGY  CT Abdomen Pelvis With Contrast    Result Date: 9/29/2023  CT ABDOMEN PELVIS W CONTRAST-  INDICATIONS: Pain  TECHNIQUE: Radiation dose reduction techniques were utilized, including automated exposure control and exposure modulation based on body size. Enhanced ABDOMEN AND PELVIS CT  COMPARISON: None available  FINDINGS:  Peripancreatic fluid and inflammatory stranding is seen, compatible with acute pancreatitis. No definite pancreatic necrosis is identified, follow-up as indications persist.  Left renal cysts and right renal low-density too small to characterize are noted.  Otherwise unremarkable appearance of the liver, gallbladder, spleen, adrenal glands, pancreas, kidneys, bladder.  No bowel obstruction or abnormal bowel thickening is identified.  No free intraperitoneal gas. Small pelvic free fluid is present.  Scattered small mesenteric and para-aortic lymph nodes are seen that are not significant by size criteria.  Abdominal aorta is not aneurysmal.  The lung bases show small atelectasis. The heart appears  mildly enlarged.  Degenerative changes are seen in the spine. No acute fracture is identified.         1. Findings compatible with acute pancreatitis, correlate clinically, follow-up as indications persist.  2. No acute inflammatory process of bowel is identified.  3. No obstructive uropathy.  This report was finalized on 9/29/2023 8:49 AM by Dr. Rome Singh M.D.       I ordered the above noted radiological studies. Reviewed by me and discussed with radiologist.  See dictation for official radiology interpretation.      PROCEDURES    Procedures      MEDICATIONS GIVEN IN ER    Medications   sodium chloride 0.9 % flush 10 mL (has no administration in time range)   sodium chloride 0.9 % flush 10 mL (has no administration in time range)   sodium chloride 0.9 % infusion (150 mL/hr Intravenous New Bag 9/29/23 1002)   cloNIDine (CATAPRES) tablet 0.1 mg (has no administration in time range)   Magnesium Standard Dose Replacement - Follow Nurse / BPA Driven Protocol (has no administration in time range)   thiamine (B-1) injection 200 mg (has no administration in time range)     Followed by   thiamine (VITAMIN B-1) tablet 100 mg (has no administration in time range)   folic acid (FOLVITE) tablet 1 mg (1 mg Oral Given 9/29/23 0954)   LORazepam (ATIVAN) tablet 2 mg (2 mg Oral Given 9/29/23 0955)     Followed by   LORazepam (ATIVAN) tablet 1 mg (has no administration in time range)   LORazepam (ATIVAN) tablet 1 mg ( Oral Not Given:  See Alt 9/29/23 0954)     Or   LORazepam (ATIVAN) injection 1 mg ( Intravenous Not Given:  See Alt 9/29/23 0954)     Or   LORazepam (ATIVAN) tablet 2 mg (2 mg Oral Not Given 9/29/23 0954)     Or   LORazepam (ATIVAN) injection 2 mg ( Intravenous Not Given:  See Alt 9/29/23 0954)     Or   LORazepam (ATIVAN) injection 2 mg ( Intravenous Not Given:  See Alt 9/29/23 0954)     Or   LORazepam (ATIVAN) injection 2 mg ( Intramuscular Not Given:  See Alt 9/29/23 0954)   sodium chloride 0.9 % bolus 1,000  mL (0 mL Intravenous Stopped 9/29/23 0903)   morphine injection 4 mg (4 mg Intravenous Given 9/29/23 0640)   ondansetron (ZOFRAN) injection 4 mg (4 mg Intravenous Given 9/29/23 0641)   famotidine (PEPCID) injection 20 mg (20 mg Intravenous Given 9/29/23 0658)   droperidol (INAPSINE) injection 1.25 mg (1.25 mg Intravenous Given 9/29/23 0707)   diphenhydrAMINE (BENADRYL) injection 25 mg (25 mg Intravenous Given 9/29/23 0720)   iopamidol (ISOVUE-300) 61 % injection 100 mL (85 mL Intravenous Given 9/29/23 0823)   droperidol (INAPSINE) injection 1.25 mg (1.25 mg Intravenous Given 9/29/23 0903)         All labs have been independently reviewed by me.  All radiology studies have been reviewed by me and I discussed with radiologist dictating the report when indicated below.  All EKG's independently viewed and interpreted by me.  Discussion below represents my analysis of pertinent findings related to patient's condition, differential diagnosis, treatment plan and final disposition.        PROGRESS, DATA ANALYSIS, CONSULTS, AND MEDICAL DECISION MAKING    Differential diagnosis includes   - hepatobiliary pathology such as cholecystitis, cholangitis, and symptomatic cholelithiasis  -PUD  -Mesenteric ischemia  - Pancreatitis  - Dyspepsia  - Small bowel or large bowel obstruction  - Appendicitis  - Diverticulitis  - UTI including pyelonephritis  - Ureteral stone  - Zoster  - Colitis, including infectious and ischemic  - Atypical ACS        ED Course as of 09/29/23 1533   Fri Sep 29, 2023   0626 On first look, this patient is a 38-year-old male presenting to the emergency room with generalized upper abdominal discomfort that has been intermittently present now for the past 3 days.  He reports that the pain tonight worsened prompting his ED visit.  He does report some associated nausea but denies vomiting, fever/chills, or diarrhea/constipation.  He does state that he does drink alcohol most days and denies previous abdominal  surgeries.  Abdominal exam is benign but does show generalized tenderness.  No rebound or guarding noted.  We will treat the patient's discomfort and nausea as we begin with labs and a CT scan of the abdomen and pelvis. [BM]   0658 WBC(!): 13.44 [MP]   0658 Hemoglobin: 16.6 [MP]   0659 WBC(!): 13.44 [MM]   0903 I reevaluated the patient.  We need to give him another dose of droperidol because he was still having pain and nausea.  I have reassessed him just now.  Discussed normal blood pressure normal heart rate.  Oxygen saturation is normal.  Still has some reproducible abdominal pain.  His exam has improved from initial exam he is not having voluntary guarding present.  But he is uncomfortable.  There is no rebound.  I did inform the results of his lab work and the fact that he has acute pancreatitis.  This is likely related to alcohol consumption.  He does drink daily. [MM]   0916 I did discuss the case with Dr. Grossman who is on for [facility].  Informed him of the patient's clinical presenting symptoms and results of the test.  Lipase currently is in process.  The first lipase had a hemolyzed specimen and they needed to recollected.  He obviously has pancreatitis on CT scan.  All questions answered.  He agrees to accept him to the hospital. [MM]      ED Course User Index  [BM] Dennys George MD  [MM] Karl Gomes MD  [MP] Majo Edwards PA-C       AS OF 15:33 EDT VITALS:    BP - 155/95  HR - 63  TEMP - 97.5 °F (36.4 °C) (Oral)  02 SATS - 99%    SOCIAL DETERMINANTS OF HEALTH THAT IMPACT OR LIMIT CARE (For example..Homelessness,safe discharge, inability to obtain care, follow up, or prescriptions):      DIAGNOSIS  Final diagnoses:   Alcohol-induced acute pancreatitis, unspecified complication status         DISPOSITION  I have reviewed the test results with my patient and explained the current treatment plan.  I answered all of the patient's questions.  The patient will be admitted to monitor bed at this time.   The patient is not hypotensive and is tolerating their current disease condition well enough for a monitored bed at this time.  The patient's current condition does not require intensive care treatment at this time.            DICTATED UTILIZING DRAGON DICTATION    Note Disclaimer: At [facility], we believe that sharing information builds trust and better relationships. You are receiving this note because you recently visited [facility]. It is possible you will see health information before a provider has talked with you about it. This kind of information can be easy to misunderstand. To help you fully understand what it means for your health, we urge you to discuss this note with your provider.       Karl Gomes MD  09/29/23 1532

## 2023-09-29 NOTE — PLAN OF CARE
Goal Outcome Evaluation:              Outcome Evaluation: VSS. Pt admitted to [facility] with alcohol induced pancreatitis. NPO for now. On IVF. CIWA 0 on admission. Scheduled ativan d/c due to pt being very lethargic. PRN ciwa ativan available. No other complaints. Needs met at this time.

## 2023-09-29 NOTE — ED TRIAGE NOTES
Pt presents via pv complaining of tingling in both hands and abdominal pain that started this morning.

## 2023-09-29 NOTE — ED PROVIDER NOTES
EMERGENCY DEPARTMENT ENCOUNTER    Room Number:  15/15  PCP: Charbel Kaur MD  Discussed/ obtained information from independent historians: ***      HPI:  Chief Complaint: Abdominal pain  A complete HPI/ROS/PMH/PSH/SH/FH are unobtainable due to: ***  Context: Rome Sanchez is a 38 y.o. male who presents to the ED c/o ***      External (non-ED) record review: ***      PAST MEDICAL HISTORY  Active Ambulatory Problems     Diagnosis Date Noted    Achilles tendon tear 04/05/2017     Resolved Ambulatory Problems     Diagnosis Date Noted    Pain 04/05/2017     No Additional Past Medical History         PAST SURGICAL HISTORY  Past Surgical History:   Procedure Laterality Date    ACHILLES TENDON SURGERY Right 02/2004         FAMILY HISTORY  No family history on file.      SOCIAL HISTORY  Social History     Socioeconomic History    Marital status: Single   Tobacco Use    Smoking status: Every Day     Types: Cigars    Smokeless tobacco: Never   Substance and Sexual Activity    Alcohol use: Yes     Comment: socially    Drug use: No    Sexual activity: Defer         ALLERGIES  Patient has no known allergies.        REVIEW OF SYSTEMS  Review of Systems   ***      PHYSICAL EXAM  ED Triage Vitals   Temp Heart Rate Resp BP SpO2   09/29/23 0557 09/29/23 0557 09/29/23 0557 09/29/23 0604 09/29/23 0557   96.3 °F (35.7 °C) 56 18 158/93 99 %      Temp src Heart Rate Source Patient Position BP Location FiO2 (%)   09/29/23 0557 -- -- -- --   Tympanic           Physical Exam      GENERAL: ***no acute distress  HENT: normocephalic, atraumatic  EYES: no scleral icterus  CV: regular rhythm, normal rate  RESPIRATORY: normal effort  ABDOMEN: nondistended  MUSCULOSKELETAL: no deformity  NEURO: alert, moves all extremities, follows commands  PSYCH:  calm, cooperative  SKIN: warm, dry    Vital signs and nursing notes reviewed.          LAB RESULTS  No results found for this or any previous visit (from the past 24 hour(s)).    Ordered the above  labs and reviewed the results.        RADIOLOGY  No Radiology Exams Resulted Within Past 24 Hours    Ordered the above noted radiological studies. Reviewed by me in PACS.            PROCEDURES  Procedures              MEDICATIONS GIVEN IN ER  Medications   sodium chloride 0.9 % flush 10 mL (has no administration in time range)   sodium chloride 0.9 % flush 10 mL (has no administration in time range)   sodium chloride 0.9 % bolus 1,000 mL (1,000 mL Intravenous New Bag 9/29/23 0635)   morphine injection 4 mg (has no administration in time range)   ondansetron (ZOFRAN) injection 4 mg (has no administration in time range)                   MEDICAL DECISION MAKING, PROGRESS, and CONSULTS    All labs have been independently reviewed by me.  All radiology studies have been reviewed by me and I have also reviewed the radiology report.   EKG's independently viewed and interpreted by me.  Discussion below represents my analysis of pertinent findings related to patient's condition, differential diagnosis, treatment plan and final disposition.            Orders placed during this visit:  Orders Placed This Encounter   Procedures    CT Abdomen Pelvis With Contrast    Gravette Draw    Comprehensive Metabolic Panel    Lipase    Urinalysis With Microscopic If Indicated (No Culture) - Urine, Clean Catch    CBC Auto Differential    Insert peripheral IV    Insert Peripheral IV    Green Top (Gel)    Lavender Top    Gold Top - SST    Light Blue Top    CBC & Differential           Differential diagnosis:  ***      Independent interpretation of labs, radiology studies, and discussions with consultants:  ED Course as of 09/29/23 0705   Fri Sep 29, 2023   0626 On first look, this patient is a 38-year-old male presenting to the emergency room with generalized upper abdominal discomfort that has been intermittently present now for the past 3 days.  He reports that the pain tonight worsened prompting his ED visit.  He does report some  associated nausea but denies vomiting, fever/chills, or diarrhea/constipation.  He does state that he does drink alcohol most days and denies previous abdominal surgeries.  Abdominal exam is benign but does show generalized tenderness.  No rebound or guarding noted.  We will treat the patient's discomfort and nausea as we begin with labs and a CT scan of the abdomen and pelvis. [BM]   0658 WBC(!): 13.44 [MP]   0658 Hemoglobin: 16.6 [MP]   0659 WBC(!): 13.44 [MM]      ED Course User Index  [BM] Dennys George MD  [MM] Karl Gomes MD  [MP] Majo Edwards PA-C       - Shared decision making:  ***    Additional orders considered but not ordered:  ***      Additional sources:    - Chronic or social conditions impacting care: ***          DIAGNOSIS  Final diagnoses:   None           Follow Up:  No follow-up provider specified.    RX:     Medication List      No changes were made to your prescriptions during this visit.         Latest Documented Vital Signs:  As of 06:39 EDT  BP- 158/93 HR- 56 Temp- 96.3 °F (35.7 °C) (Tympanic) O2 sat- 99%              --    Please note that portions of this were completed with a voice recognition program.       Note Disclaimer: At [facility], we believe that sharing information builds trust and better relationships. You are receiving this note because you are receiving care at [facility] or recently visited. It is possible you will see health information before a provider has talked with you about it. This kind of information can be easy to misunderstand. To help you fully understand what it means for your health, we urge you to discuss this note with your provider.

## 2023-09-29 NOTE — PROGRESS NOTES
Clinical Pharmacy Services: Medication History    Rome Sanchez is a 38 y.o. male presenting to [facility] for   Chief Complaint   Patient presents with    Abdominal Pain    Tingling       He  has no past medical history on file.    Allergies as of 09/29/2023    (No Known Allergies)       Medication information was obtained from: Patient   Pharmacy and Phone Number:     Prior to Admission Medications       Prescriptions Last Dose Informant Patient Reported? Taking?    ibuprofen (ADVIL,MOTRIN) 800 MG tablet   No No    Take 1 tablet by mouth Every 6 (Six) Hours As Needed for Headache.              Medication notes: Patient confirmed he does not take any medications.     This medication list is complete to the best of my knowledge as of 9/29/2023    Please call if questions.    Keron Ingram  Medication History Technician  [phone]    9/29/2023 09:14 EDT

## 2023-09-29 NOTE — ED NOTES
Nursing report ED to floor  [facility]  38 y.o.  male    HPI :   Chief Complaint   Patient presents with    Abdominal Pain    Tingling       Admitting doctor:   Colt Grossman MD    Admitting diagnosis:   The encounter diagnosis was Alcohol-induced acute pancreatitis, unspecified complication status.    Code status:   Current Code Status       Date Active Code Status Order ID Comments User Context       Not on file            Allergies:   Patient has no known allergies.    Isolation:   No active isolations    Intake and Output    Intake/Output Summary (Last 24 hours) at 9/29/2023 1230  Last data filed at 9/29/2023 0903  Gross per 24 hour   Intake 1000 ml   Output --   Net 1000 ml       Weight:       09/29/23  0557   Weight: 78.5 kg (173 lb)       Most recent vitals:   Vitals:    09/29/23 0858 09/29/23 0903 09/29/23 1030 09/29/23 1208   BP: 146/99 159/98     Pulse: 60 65 66 63   Resp:       Temp:       TempSrc:       SpO2: 98% 98% 100% 99%   Weight:       Height:           Active LDAs/IV Access:   Lines, Drains & Airways       Active LDAs       Name Placement date Placement time Site Days    Peripheral IV 09/29/23 0632 Anterior;Left;Proximal Forearm 09/29/23  0632  Forearm  less than 1                    Labs (abnormal labs have a star):   Labs Reviewed   COMPREHENSIVE METABOLIC PANEL - Abnormal; Notable for the following components:       Result Value    Glucose 128 (*)     CO2 21.0 (*)     All other components within normal limits    Narrative:     GFR Normal >60  Chronic Kidney Disease <60  Kidney Failure <15     LIPASE - Abnormal; Notable for the following components:    Lipase 205 (*)     All other components within normal limits   URINALYSIS W/ MICROSCOPIC IF INDICATED (NO CULTURE) - Abnormal; Notable for the following components:    pH, UA 8.5 (*)     Ketones, UA 15 mg/dL (1+) (*)     Protein, UA Trace (*)     All other components within normal limits    Narrative:     Urine microscopic not indicated.    CBC WITH AUTO DIFFERENTIAL - Abnormal; Notable for the following components:    WBC 13.44 (*)     MCH 33.3 (*)     Lymphocyte % 15.9 (*)     Neutrophils, Absolute 10.13 (*)     Monocytes, Absolute 1.07 (*)     All other components within normal limits   URINE DRUG SCREEN - Abnormal; Notable for the following components:    Opiate Screen Positive (*)     THC, Screen, Urine Positive (*)     All other components within normal limits    Narrative:     Negative Thresholds Per Drugs Screened:    Amphetamines                 500 ng/ml  Barbiturates                 200 ng/ml  Benzodiazepines              100 ng/ml  Cocaine                      300 ng/ml  Methadone                    300 ng/ml  Opiates                      300 ng/ml  Oxycodone                    100 ng/ml  THC                           50 ng/ml  Fentanyl                       5 ng/ml      The Normal Value for all drugs tested is negative. This report includes final unconfirmed screening results to be used for medical treatment purposes only. Unconfirmed results must not be used for non-medical purposes such as employment or legal testing. Clinical consideration should be applied to any drug of abuse test, particularly when unconfirmed results are used.           LIPID PANEL - Abnormal; Notable for the following components:    HDL Cholesterol 5 (*)     All other components within normal limits    Narrative:     Cholesterol Reference Ranges  (U.S. Department of Health and Human Services ATP III Classifications)    Desirable          <200 mg/dL  Borderline High    200-239 mg/dL  High Risk          >240 mg/dL      Triglyceride Reference Ranges  (U.S. Department of Health and Human Services ATP III Classifications)    Normal           <150 mg/dL  Borderline High  150-199 mg/dL  High             200-499 mg/dL  Very High        >500 mg/dL    HDL Reference Ranges  (U.S. Department of Health and Human Services ATP III Classifications)    Low     <40 mg/dl (major  risk factor for CHD)  High    >60 mg/dl ('negative' risk factor for CHD)        LDL Reference Ranges  (U.S. Department of Health and Human Services ATP III Classifications)    Optimal          <100 mg/dL  Near Optimal     100-129 mg/dL  Borderline High  130-159 mg/dL  High             160-189 mg/dL  Very High        >189 mg/dL   AMYLASE - Abnormal; Notable for the following components:    Amylase 123 (*)     All other components within normal limits   MAGNESIUM - Normal   BLOOD CULTURE   BLOOD CULTURE   RAINBOW DRAW    Narrative:     The following orders were created for panel order [place] Draw.  Procedure                               Abnormality         Status                     ---------                               -----------         ------                     Green Top (Gel)[[id number]]                                   Final result               Lavender Top[[id number]]                                      Final result               Gold Top - SST[[id number]]                                    Final result               Light Blue Top[[id number]]                                    Final result                 Please view results for these tests on the individual orders.   ETHANOL   LACTIC ACID, PLASMA   GREEN TOP   LAVENDER TOP   GOLD TOP - SST   LIGHT BLUE TOP   CBC AND DIFFERENTIAL    Narrative:     The following orders were created for panel order CBC & Differential.  Procedure                               Abnormality         Status                     ---------                               -----------         ------                     CBC Auto Differential[[id number]]        Abnormal            Final result                 Please view results for these tests on the individual orders.       EKG:   No orders to display       Meds given in ED:   Medications   sodium chloride 0.9 % flush 10 mL (has no administration in time range)   sodium chloride 0.9 % flush 10 mL (has no administration in time range)   sodium  chloride 0.9 % infusion (150 mL/hr Intravenous New Bag 9/29/23 1002)   cloNIDine (CATAPRES) tablet 0.1 mg (has no administration in time range)   Magnesium Standard Dose Replacement - Follow Nurse / BPA Driven Protocol (has no administration in time range)   thiamine (B-1) injection 200 mg (has no administration in time range)     Followed by   thiamine (VITAMIN B-1) tablet 100 mg (has no administration in time range)   folic acid (FOLVITE) tablet 1 mg (1 mg Oral Given 9/29/23 0954)   LORazepam (ATIVAN) tablet 2 mg (2 mg Oral Given 9/29/23 0955)     Followed by   LORazepam (ATIVAN) tablet 1 mg (has no administration in time range)   LORazepam (ATIVAN) tablet 1 mg ( Oral Not Given:  See Alt 9/29/23 0954)     Or   LORazepam (ATIVAN) injection 1 mg ( Intravenous Not Given:  See Alt 9/29/23 0954)     Or   LORazepam (ATIVAN) tablet 2 mg (2 mg Oral Not Given 9/29/23 0954)     Or   LORazepam (ATIVAN) injection 2 mg ( Intravenous Not Given:  See Alt 9/29/23 0954)     Or   LORazepam (ATIVAN) injection 2 mg ( Intravenous Not Given:  See Alt 9/29/23 0954)     Or   LORazepam (ATIVAN) injection 2 mg ( Intramuscular Not Given:  See Alt 9/29/23 0954)   sodium chloride 0.9 % bolus 1,000 mL (0 mL Intravenous Stopped 9/29/23 0903)   morphine injection 4 mg (4 mg Intravenous Given 9/29/23 0640)   ondansetron (ZOFRAN) injection 4 mg (4 mg Intravenous Given 9/29/23 0641)   famotidine (PEPCID) injection 20 mg (20 mg Intravenous Given 9/29/23 0658)   droperidol (INAPSINE) injection 1.25 mg (1.25 mg Intravenous Given 9/29/23 0707)   diphenhydrAMINE (BENADRYL) injection 25 mg (25 mg Intravenous Given 9/29/23 0720)   iopamidol (ISOVUE-300) 61 % injection 100 mL (85 mL Intravenous Given 9/29/23 0823)   droperidol (INAPSINE) injection 1.25 mg (1.25 mg Intravenous Given 9/29/23 0903)       Imaging results:  CT Abdomen Pelvis With Contrast    Result Date: 9/29/2023   1. Findings compatible with acute pancreatitis, correlate clinically, follow-up  as indications persist.  2. No acute inflammatory process of bowel is identified.  3. No obstructive uropathy.  This report was finalized on 9/29/2023 8:49 AM by Dr. Rome Singh M.D.       Ambulatory status:   - adlib    Social issues:   Social History     Socioeconomic History    Marital status: Single   Tobacco Use    Smoking status: Every Day     Types: Cigars    Smokeless tobacco: Never   Substance and Sexual Activity    Alcohol use: Yes     Comment: socially    Drug use: No    Sexual activity: Defer       NIH Stroke Scale:       Lizzy Loo RN  09/29/23 12:30 EDT

## 2023-09-29 NOTE — H&P
Patient Name:  Rome Sanchez  YOB: 1985  MRN:  [id number]  Admit Date:  9/29/2023  Patient Care Team:  Charbel Kaur MD as PCP - General (Family Medicine)      Subjective   History Present Illness     Chief Complaint   Patient presents with    Abdominal Pain    Tingling       Mr. Sanchez is a 38 y.o. cigar smoker that presents with abdominal pain, nausea, vomiting.  Patient is alert and oriented x3 but upon entering the room he is sleeping.  I asked repeatedly for him to wake up, answer questions fully and so I can examine him but he is not inclined to do so at this time.  Per ER physician notes he reported abdominal pain onset 3 days ago primarily in the upper abdomen and right side.  Pain was waxing and waning in severity and associate with nausea and vomiting.  He denies fever or diarrhea.  Patient drinks alcohol daily and smokes marijuana daily but will not answer how much alcohol or when his last ETOH was consumed.      History of Present Illness    Review of Systems   Cardiovascular:  Negative for chest pain.   Gastrointestinal:  Positive for abdominal pain, nausea and vomiting. Negative for abdominal distention, blood in stool, constipation and diarrhea.   Musculoskeletal:  Negative for back pain.    Review of systems is limited by patient's lack of cooperation  Personal History     No past medical history on file.  Past Surgical History:   Procedure Laterality Date    ACHILLES TENDON SURGERY Right 02/2004     No family history on file.  Social History     Tobacco Use    Smoking status: Every Day     Types: Cigars    Smokeless tobacco: Never   Substance Use Topics    Alcohol use: Yes     Comment: socially    Drug use: No     No current facility-administered medications on file prior to encounter.     Current Outpatient Medications on File Prior to Encounter   Medication Sig Dispense Refill    ibuprofen (ADVIL,MOTRIN) 800 MG tablet Take 1 tablet by mouth Every 6 (Six) Hours As Needed for  Headache. 90 tablet 1    [DISCONTINUED] cyclobenzaprine (FLEXERIL) 10 MG tablet Take 1 tablet by mouth 2 (two) times a day.      [DISCONTINUED] HYDROcodone-acetaminophen (NORCO) 5-325 MG per tablet Take 1 tablet by mouth Every 4 (Four) Hours As Needed for Moderate Pain  (head injury pain) for up to 20 doses. 12 tablet 0    [DISCONTINUED] methocarbamol (ROBAXIN) 500 MG tablet Take 500 mg by mouth 4 (Four) Times a Day.       No Known Allergies    Objective    Objective     Vital Signs  Temp:  [96.3 °F (35.7 °C)] 96.3 °F (35.7 °C)  Heart Rate:  [54-65] 65  Resp:  [18] 18  BP: (146-159)/(93-99) 159/98  SpO2:  [96 %-100 %] 98 %  on   ;      Body mass index is 27.1 kg/m².    Physical Exam  Constitutional:       Appearance: Normal appearance. He is ill-appearing (mild).   Cardiovascular:      Rate and Rhythm: Normal rate and regular rhythm.   Pulmonary:      Effort: Pulmonary effort is normal. No respiratory distress.      Breath sounds: Normal breath sounds.   Abdominal:      General: Bowel sounds are normal. There is no distension.      Palpations: Abdomen is soft.      Tenderness: There is abdominal tenderness.   Neurological:      General: No focal deficit present.      Mental Status: He is oriented to person, place, and time.   Psychiatric:      Comments: Uncooperative       Results Review:  I reviewed the patient's new clinical results.  I reviewed the patient's new imaging results and agree with the interpretation.  I reviewed the patient's other test results and agree with the interpretation  I personally viewed and interpreted the patient's EKG/Telemetry data  Discussed with ED provider.    Lab Results (last 24 hours)       Procedure Component Value Units Date/Time    CBC & Differential [[id number]]  (Abnormal) Collected: 09/29/23 0635    Specimen: Blood Updated: 09/29/23 0651    Narrative:      The following orders were created for panel order CBC & Differential.  Procedure                                Abnormality         Status                     ---------                               -----------         ------                     CBC Auto Differential[[id number]]        Abnormal            Final result                 Please view results for these tests on the individual orders.    Comprehensive Metabolic Panel [[id number]]  (Abnormal) Collected: 09/29/23 0635    Specimen: Blood Updated: 09/29/23 0754     Glucose 128 mg/dL      BUN 9 mg/dL      Creatinine 1.15 mg/dL      Sodium 136 mmol/L      Potassium 4.9 mmol/L      Comment: Specimen hemolyzed.  Results may be affected.        Chloride 100 mmol/L      CO2 21.0 mmol/L      Calcium 9.6 mg/dL      Total Protein 7.3 g/dL      Albumin 4.2 g/dL      ALT (SGPT) 9 U/L      Comment: Specimen hemolyzed.  Results may be affected.        AST (SGOT) 28 U/L      Comment: Specimen hemolyzed.  Results may be affected.        Alkaline Phosphatase 61 U/L      Total Bilirubin 0.7 mg/dL      Globulin 3.1 gm/dL      A/G Ratio 1.4 g/dL      BUN/Creatinine Ratio 7.8     Anion Gap 15.0 mmol/L      eGFR 82.5 mL/min/1.73     Narrative:      GFR Normal >60  Chronic Kidney Disease <60  Kidney Failure <15      CBC Auto Differential [[id number]]  (Abnormal) Collected: 09/29/23 0635    Specimen: Blood Updated: 09/29/23 0651     WBC 13.44 10*3/mm3      RBC 4.99 10*6/mm3      Hemoglobin 16.6 g/dL      Hematocrit 47.3 %      MCV 94.8 fL      MCH 33.3 pg      MCHC 35.1 g/dL      RDW 13.1 %      RDW-SD 45.3 fl      MPV 11.2 fL      Platelets 186 10*3/mm3      Neutrophil % 75.4 %      Lymphocyte % 15.9 %      Monocyte % 8.0 %      Eosinophil % 0.3 %      Basophil % 0.1 %      Immature Grans % 0.3 %      Neutrophils, Absolute 10.13 10*3/mm3      Lymphocytes, Absolute 2.14 10*3/mm3      Monocytes, Absolute 1.07 10*3/mm3      Eosinophils, Absolute 0.04 10*3/mm3      Basophils, Absolute 0.02 10*3/mm3      Immature Grans, Absolute 0.04 10*3/mm3      nRBC 0.0 /100 WBC     Ethanol [[id number]] Collected:  09/29/23 0635    Specimen: Blood Updated: 09/29/23 0755     Ethanol <10 mg/dL      Comment: Specimen hemolyzed.  Results may be affected.        Ethanol % <0.010 %     Magnesium [[id number]]  (Normal) Collected: 09/29/23 0635    Specimen: Blood Updated: 09/29/23 0754     Magnesium 2.1 mg/dL     Urinalysis With Microscopic If Indicated (No Culture) - Urine, Clean Catch [[id number]]  (Abnormal) Collected: 09/29/23 0900    Specimen: Urine, Clean Catch Updated: 09/29/23 0924     Color, UA Yellow     Appearance, UA Clear     pH, UA 8.5     Specific Gravity, UA 1.025     Glucose, UA Negative     Ketones, UA 15 mg/dL (1+)     Bilirubin, UA Negative     Blood, UA Negative     Protein, UA Trace     Leuk Esterase, UA Negative     Nitrite, UA Negative     Urobilinogen, UA 1.0 E.U./dL    Narrative:      Urine microscopic not indicated.    Urine Drug Screen - Urine, Clean Catch [[id number]]  (Abnormal) Collected: 09/29/23 0900    Specimen: Urine, Clean Catch Updated: 09/29/23 0940     Amphet/Methamphet, Screen Negative     Barbiturates Screen, Urine Negative     Benzodiazepine Screen, Urine Negative     Cocaine Screen, Urine Negative     Opiate Screen Positive     THC, Screen, Urine Positive     Methadone Screen, Urine Negative     Oxycodone Screen, Urine Negative     Fentanyl, Urine Negative    Narrative:      Negative Thresholds Per Drugs Screened:    Amphetamines                 500 ng/ml  Barbiturates                 200 ng/ml  Benzodiazepines              100 ng/ml  Cocaine                      300 ng/ml  Methadone                    300 ng/ml  Opiates                      300 ng/ml  Oxycodone                    100 ng/ml  THC                           50 ng/ml  Fentanyl                       5 ng/ml      The Normal Value for all drugs tested is negative. This report includes final unconfirmed screening results to be used for medical treatment purposes only. Unconfirmed results must not be used for non-medical purposes such as  employment or legal testing. Clinical consideration should be applied to any drug of abuse test, particularly when unconfirmed results are used.            Lipase [[id number]]  (Abnormal) Collected: 09/29/23 0901    Specimen: Blood Updated: 09/29/23 0937     Lipase 205 U/L     Lipid Panel [[id number]] Collected: 09/29/23 0901    Specimen: Blood Updated: 09/29/23 0938    Amylase [[id number]] Collected: 09/29/23 0901    Specimen: Blood Updated: 09/29/23 0938            Imaging Results (Last 24 Hours)       Procedure Component Value Units Date/Time    CT Abdomen Pelvis With Contrast [[id number]] Collected: 09/29/23 0838     Updated: 09/29/23 0853    Narrative:      CT ABDOMEN PELVIS W CONTRAST-     INDICATIONS: Pain     TECHNIQUE: Radiation dose reduction techniques were utilized, including  automated exposure control and exposure modulation based on body size.  Enhanced ABDOMEN AND PELVIS CT     COMPARISON: None available     FINDINGS:     Peripancreatic fluid and inflammatory stranding is seen, compatible with  acute pancreatitis. No definite pancreatic necrosis is identified,  follow-up as indications persist.     Left renal cysts and right renal low-density too small to characterize  are noted.     Otherwise unremarkable appearance of the liver, gallbladder, spleen,  adrenal glands, pancreas, kidneys, bladder.     No bowel obstruction or abnormal bowel thickening is identified.     No free intraperitoneal gas. Small pelvic free fluid is present.     Scattered small mesenteric and para-aortic lymph nodes are seen that are  not significant by size criteria.     Abdominal aorta is not aneurysmal.      The lung bases show small atelectasis. The heart appears mildly  enlarged.     Degenerative changes are seen in the spine. No acute fracture is  identified.             Impression:         1. Findings compatible with acute pancreatitis, correlate clinically,  follow-up as indications persist.     2. No acute inflammatory  process of bowel is identified.     3. No obstructive uropathy.     This report was finalized on 9/29/2023 8:49 AM by Dr. Rome Singh M.D.                   No orders to display        Assessment/Plan     Active Hospital Problems    Diagnosis  POA    **Acute pancreatitis [K85.90]  Yes    Cigar smoker [F17.290]  Unknown    Alcohol abuse [F10.10]  Unknown    Tetrahydrocannabinol (THC) use disorder, moderate, dependence [F12.20]  Unknown      Resolved Hospital Problems   No resolved problems to display.       Mr. Sanchez is a 38 y.o. smoker with daily alcohol use and cannabis use who presents with abdominal pain, nausea, and vomiting.    Acute EtOH pancreatitis  Lipase 205, CT abdomen pelvis with acute pancreatitis, abdominal exam consistent with pancreatitis  No abnormality of gallbladder on CT.  Lipid panel WNL  Supportive care with IV fluids, antiemetics, analgesics.  Cautious use of narcotics given substance abuse history so wean when able.   Bowel rest today likely advance to liquids tmrw     Alcohol abuse/marijuana use  UDS positive for opiates but he was given narcotics in the ER prior to any UA.  Access consult.  CIWA scores.  Replace electrolytes.        I discussed the patient's findings and my recommendations with patient.    VTE Prophylaxis - SCDs.  Code Status - Full code.     NEEMA Quintero  [facility]  09/29/23  09:42 EDT

## 2023-09-30 ENCOUNTER — INPATIENT HOSPITAL (OUTPATIENT)
Dept: URBAN - METROPOLITAN AREA HOSPITAL 113 | Facility: HOSPITAL | Age: 38
End: 2023-09-30

## 2023-09-30 DIAGNOSIS — F10.10 ALCOHOL ABUSE, UNCOMPLICATED: ICD-10-CM

## 2023-09-30 DIAGNOSIS — R11.2 NAUSEA WITH VOMITING, UNSPECIFIED: ICD-10-CM

## 2023-09-30 DIAGNOSIS — R10.9 UNSPECIFIED ABDOMINAL PAIN: ICD-10-CM

## 2023-09-30 DIAGNOSIS — K85.20 ALCOHOL INDUCED ACUTE PANCREATITIS WITHOUT NECROSIS OR INFEC: ICD-10-CM

## 2023-09-30 LAB
ALBUMIN SERPL-MCNC: 3.5 G/DL (ref 3.5–5.2)
ALBUMIN/GLOB SERPL: 1.3 G/DL
ALP SERPL-CCNC: 47 U/L (ref 39–117)
ALT SERPL W P-5'-P-CCNC: <5 U/L (ref 1–41)
ANION GAP SERPL CALCULATED.3IONS-SCNC: 9 MMOL/L (ref 5–15)
AST SERPL-CCNC: 15 U/L (ref 1–40)
BASOPHILS # BLD AUTO: 0.02 10*3/MM3 (ref 0–0.2)
BASOPHILS NFR BLD AUTO: 0.1 % (ref 0–1.5)
BILIRUB SERPL-MCNC: 0.7 MG/DL (ref 0–1.2)
BUN SERPL-MCNC: 7 MG/DL (ref 6–20)
BUN/CREAT SERPL: 7.4 (ref 7–25)
CALCIUM SPEC-SCNC: 8.8 MG/DL (ref 8.6–10.5)
CHLORIDE SERPL-SCNC: 102 MMOL/L (ref 98–107)
CO2 SERPL-SCNC: 26 MMOL/L (ref 22–29)
CREAT SERPL-MCNC: 0.95 MG/DL (ref 0.76–1.27)
DEPRECATED RDW RBC AUTO: 45.8 FL (ref 37–54)
EGFRCR SERPLBLD CKD-EPI 2021: 103.8 ML/MIN/1.73
EOSINOPHIL # BLD AUTO: 0.01 10*3/MM3 (ref 0–0.4)
EOSINOPHIL NFR BLD AUTO: 0.1 % (ref 0.3–6.2)
ERYTHROCYTE [DISTWIDTH] IN BLOOD BY AUTOMATED COUNT: 13.5 % (ref 12.3–15.4)
GLOBULIN UR ELPH-MCNC: 2.7 GM/DL
GLUCOSE SERPL-MCNC: 99 MG/DL (ref 65–99)
HCT VFR BLD AUTO: 43.8 % (ref 37.5–51)
HGB BLD-MCNC: 15.6 G/DL (ref 13–17.7)
IMM GRANULOCYTES # BLD AUTO: 0.03 10*3/MM3 (ref 0–0.05)
IMM GRANULOCYTES NFR BLD AUTO: 0.2 % (ref 0–0.5)
LIPASE SERPL-CCNC: 1374 U/L (ref 13–60)
LYMPHOCYTES # BLD AUTO: 1.32 10*3/MM3 (ref 0.7–3.1)
LYMPHOCYTES NFR BLD AUTO: 9.3 % (ref 19.6–45.3)
MCH RBC QN AUTO: 33.4 PG (ref 26.6–33)
MCHC RBC AUTO-ENTMCNC: 35.6 G/DL (ref 31.5–35.7)
MCV RBC AUTO: 93.8 FL (ref 79–97)
MONOCYTES # BLD AUTO: 1.33 10*3/MM3 (ref 0.1–0.9)
MONOCYTES NFR BLD AUTO: 9.4 % (ref 5–12)
NEUTROPHILS NFR BLD AUTO: 11.48 10*3/MM3 (ref 1.7–7)
NEUTROPHILS NFR BLD AUTO: 80.9 % (ref 42.7–76)
NRBC BLD AUTO-RTO: 0 /100 WBC (ref 0–0.2)
PLATELET # BLD AUTO: 173 10*3/MM3 (ref 140–450)
PMV BLD AUTO: 11.5 FL (ref 6–12)
POTASSIUM SERPL-SCNC: 3.9 MMOL/L (ref 3.5–5.2)
PROT SERPL-MCNC: 6.2 G/DL (ref 6–8.5)
RBC # BLD AUTO: 4.67 10*6/MM3 (ref 4.14–5.8)
SODIUM SERPL-SCNC: 137 MMOL/L (ref 136–145)
WBC NRBC COR # BLD: 14.19 10*3/MM3 (ref 3.4–10.8)

## 2023-09-30 PROCEDURE — 85025 COMPLETE CBC W/AUTO DIFF WBC: CPT | Performed by: INTERNAL MEDICINE

## 2023-09-30 PROCEDURE — 90791 PSYCH DIAGNOSTIC EVALUATION: CPT

## 2023-09-30 PROCEDURE — 99222 1ST HOSP IP/OBS MODERATE 55: CPT | Performed by: INTERNAL MEDICINE

## 2023-09-30 PROCEDURE — 25010000002 LORAZEPAM PER 2 MG: Performed by: NURSE PRACTITIONER

## 2023-09-30 PROCEDURE — 80053 COMPREHEN METABOLIC PANEL: CPT | Performed by: NURSE PRACTITIONER

## 2023-09-30 PROCEDURE — 83690 ASSAY OF LIPASE: CPT | Performed by: NURSE PRACTITIONER

## 2023-09-30 PROCEDURE — 25010000002 THIAMINE HCL 200 MG/2ML SOLUTION: Performed by: NURSE PRACTITIONER

## 2023-09-30 RX ORDER — SCOLOPAMINE TRANSDERMAL SYSTEM 1 MG/1
1 PATCH, EXTENDED RELEASE TRANSDERMAL
Status: DISCONTINUED | OUTPATIENT
Start: 2023-09-30 | End: 2023-10-01 | Stop reason: HOSPADM

## 2023-09-30 RX ORDER — CEPHALEXIN 500 MG/1
500 CAPSULE ORAL EVERY 6 HOURS SCHEDULED
Status: CANCELLED | OUTPATIENT
Start: 2023-09-30

## 2023-09-30 RX ADMIN — SODIUM CHLORIDE 150 ML/HR: 9 INJECTION, SOLUTION INTRAVENOUS at 07:29

## 2023-09-30 RX ADMIN — CLONIDINE HYDROCHLORIDE 0.1 MG: 0.1 TABLET ORAL at 07:28

## 2023-09-30 RX ADMIN — FOLIC ACID 1 MG: 1 TABLET ORAL at 08:15

## 2023-09-30 RX ADMIN — THIAMINE HYDROCHLORIDE 200 MG: 100 INJECTION, SOLUTION INTRAMUSCULAR; INTRAVENOUS at 00:21

## 2023-09-30 RX ADMIN — CLONIDINE HYDROCHLORIDE 0.1 MG: 0.1 TABLET ORAL at 18:13

## 2023-09-30 RX ADMIN — CLONIDINE HYDROCHLORIDE 0.1 MG: 0.1 TABLET ORAL at 12:24

## 2023-09-30 RX ADMIN — LORAZEPAM 1 MG: 2 INJECTION INTRAMUSCULAR; INTRAVENOUS at 21:28

## 2023-09-30 RX ADMIN — THIAMINE HYDROCHLORIDE 200 MG: 100 INJECTION, SOLUTION INTRAMUSCULAR; INTRAVENOUS at 07:28

## 2023-09-30 RX ADMIN — CLONIDINE HYDROCHLORIDE 0.1 MG: 0.1 TABLET ORAL at 00:21

## 2023-09-30 RX ADMIN — THIAMINE HYDROCHLORIDE 200 MG: 100 INJECTION, SOLUTION INTRAMUSCULAR; INTRAVENOUS at 21:32

## 2023-09-30 RX ADMIN — THIAMINE HYDROCHLORIDE 200 MG: 100 INJECTION, SOLUTION INTRAMUSCULAR; INTRAVENOUS at 14:39

## 2023-09-30 RX ADMIN — SCOPALAMINE 1 PATCH: 1 PATCH, EXTENDED RELEASE TRANSDERMAL at 19:17

## 2023-09-30 RX ADMIN — SODIUM CHLORIDE 150 ML/HR: 9 INJECTION, SOLUTION INTRAVENOUS at 15:16

## 2023-09-30 NOTE — CONSULTS
[facility]   Consult Note    Patient Name: Rome Sanchez  : 1985  MRN: [id number]  Primary Care Physician:  Charbel Kaur MD  Referring Physician: No ref. provider found  Date of admission: 2023    Inpatient Gastroenterology Consult  Consult performed by: Veto Salinas MD  Consult ordered by: Kj Andrew MD      Subjective   Subjective     Reason for Consult/ Chief Complaint: abdominal pain     History of Present Illness  Rome Sanchez is a 38 y.o. male presents with severe abdominal pain, nausea and vomiting.  The nausea is worse with movement.  He does consume etoh.  He states he intends to quit using alcohol.  He denies any fevers, chills, black or bloody bowel movements .      Review of Systems   Gastrointestinal:  Positive for abdominal pain, nausea and vomiting.   Neurological:  Positive for weakness.      Personal History     History reviewed. No pertinent past medical history.    Past Surgical History:   Procedure Laterality Date    ACHILLES TENDON SURGERY Right 2004       Family History: family history is not on file. Otherwise pertinent FHx was reviewed and not pertinent to current issue.    Social History:  reports that he has been smoking cigars. He has never used smokeless tobacco. He reports current alcohol use. He reports that he does not use drugs.    Home Medications:   ibuprofen    Allergies:  No Known Allergies    Objective    Objective     Vitals:  Temp:  [99 °F (37.2 °C)-99.5 °F (37.5 °C)] 99.1 °F (37.3 °C)  Heart Rate:  [64-84] 68  Resp:  [18-20] 18  BP: (142-148)/() 148/97    Physical Exam  HENT:      Right Ear: External ear normal.      Left Ear: External ear normal.      Mouth/Throat:      Pharynx: Oropharynx is clear.   Eyes:      Conjunctiva/sclera: Conjunctivae normal.   Abdominal:      General: Bowel sounds are decreased. There is distension.      Tenderness: There is abdominal tenderness. There is guarding. There is no rebound.   Skin:     General: Skin  is warm and dry.   Neurological:      General: No focal deficit present.      Mental Status: He is alert.   Psychiatric:         Mood and Affect: Mood normal.       Result Review    Result Review:  I have personally reviewed the results from the time of this admission to 9/30/2023 17:43 EDT and agree with these findings:  []  Laboratory list / accordion  []  Microbiology  []  Radiology  []  EKG/Telemetry   []  Cardiology/Vascular   []  Pathology  []  Old records  []  Other:  Most notable findings include:       Assessment & Plan   Assessment / Plan     Brief Patient Summary:  Rome Sanchez is a 38 y.o. male who   Acute pancreatitis secondary to ethanol  Nausea and vomiting- vestibular component as worse with movement     Active Hospital Problems:  Active Hospital Problems    Diagnosis     **Acute pancreatitis     Cigar smoker     Alcohol abuse     Tetrahydrocannabinol (THC) use disorder, moderate, dependence      Plan: scopolamine  Continue gut rest.  Discussed with patient the importance of alcohol cessation. He understands that continue etoh use could result In more admissions, surgery and death.        Veto Salinas MD

## 2023-09-30 NOTE — PROGRESS NOTES
"DAILY PROGRESS NOTE  [facility]    Patient Identification:  Name: Rome Sanchez  Age: 38 y.o.  Sex: male  :  1985  MRN: [id number]         Primary Care Physician: Charbel Kaur MD    Subjective:  Interval History: He complains of upper abdominal pain and nausea.    Objective:    Scheduled Meds:cloNIDine, 0.1 mg, Oral, Q6H  folic acid, 1 mg, Oral, Daily  thiamine (B-1) IV, 200 mg, Intravenous, Q8H   Followed by  [START ON 10/4/2023] thiamine, 100 mg, Oral, Daily      Continuous Infusions:sodium chloride, 150 mL/hr, Last Rate: 150 mL/hr (23 0729)        Vital signs in last 24 hours:  Temp:  [97.5 °F (36.4 °C)-99.5 °F (37.5 °C)] 99.1 °F (37.3 °C)  Heart Rate:  [64-84] 68  Resp:  [16-20] 18  BP: (142-155)/() 148/97    Intake/Output:    Intake/Output Summary (Last 24 hours) at 2023 1331  Last data filed at 2023 0903  Gross per 24 hour   Intake 0 ml   Output 550 ml   Net -550 ml       Exam:  /97 (BP Location: Right arm, Patient Position: Lying)   Pulse 68   Temp 99.1 °F (37.3 °C) (Oral)   Resp 18   Ht 170.2 cm (67\")   Wt 78.5 kg (173 lb)   SpO2 98%   BMI 27.10 kg/m²     General Appearance:    Alert, cooperative, no distress   Head:    Normocephalic, without obvious abnormality, atraumatic   Eyes:       Throat:   Lips, tongue, gums normal   Neck:   Supple, symmetrical, trachea midline, no JVD   Lungs:     Clear to auscultation bilaterally, respirations unlabored   Chest Wall:    No tenderness or deformity    Heart:    Regular rate and rhythm, S1 and S2 normal, no murmur,no  Rub or gallop   Abdomen:     Soft, epigastric tenderness, bowel sounds active, no masses, no organomegaly    Extremities:   Extremities normal, atraumatic, no cyanosis or edema   Pulses:      Skin:   Skin is warm and dry,  no rashes or palpable lesions   Neurologic:   no focal deficits noted      Lab Results (last 72 hours)       Procedure Component Value Units Date/Time    Blood Culture - " Blood, Arm, Right [[id number]]  (Normal) Collected: 09/29/23 1236    Specimen: Blood from Arm, Right Updated: 09/30/23 1301     Blood Culture No growth at 24 hours    Blood Culture - Blood, Hand, Left [[id number]]  (Normal) Collected: 09/29/23 1252    Specimen: Blood from Hand, Left Updated: 09/30/23 1301     Blood Culture No growth at 24 hours    Lipase [[id number]]  (Abnormal) Collected: 09/30/23 0522    Specimen: Blood Updated: 09/30/23 0617     Lipase 1,374 U/L     Comprehensive Metabolic Panel [[id number]] Collected: 09/30/23 0522    Specimen: Blood Updated: 09/30/23 0612     Glucose 99 mg/dL      BUN 7 mg/dL      Creatinine 0.95 mg/dL      Sodium 137 mmol/L      Potassium 3.9 mmol/L      Chloride 102 mmol/L      CO2 26.0 mmol/L      Calcium 8.8 mg/dL      Total Protein 6.2 g/dL      Albumin 3.5 g/dL      ALT (SGPT) <5 U/L      AST (SGOT) 15 U/L      Alkaline Phosphatase 47 U/L      Total Bilirubin 0.7 mg/dL      Globulin 2.7 gm/dL      A/G Ratio 1.3 g/dL      BUN/Creatinine Ratio 7.4     Anion Gap 9.0 mmol/L      eGFR 103.8 mL/min/1.73     Narrative:      GFR Normal >60  Chronic Kidney Disease <60  Kidney Failure <15      CBC & Differential [[id number]]  (Abnormal) Collected: 09/30/23 0522    Specimen: Blood Updated: 09/30/23 0548    Narrative:      The following orders were created for panel order CBC & Differential.  Procedure                               Abnormality         Status                     ---------                               -----------         ------                     CBC Auto Differential[[id number]]        Abnormal            Final result                 Please view results for these tests on the individual orders.    CBC Auto Differential [[id number]]  (Abnormal) Collected: 09/30/23 0522    Specimen: Blood Updated: 09/30/23 0548     WBC 14.19 10*3/mm3      RBC 4.67 10*6/mm3      Hemoglobin 15.6 g/dL      Hematocrit 43.8 %      MCV 93.8 fL      MCH 33.4 pg      MCHC 35.6 g/dL      RDW 13.5 %       RDW-SD 45.8 fl      MPV 11.5 fL      Platelets 173 10*3/mm3      Neutrophil % 80.9 %      Lymphocyte % 9.3 %      Monocyte % 9.4 %      Eosinophil % 0.1 %      Basophil % 0.1 %      Immature Grans % 0.2 %      Neutrophils, Absolute 11.48 10*3/mm3      Lymphocytes, Absolute 1.32 10*3/mm3      Monocytes, Absolute 1.33 10*3/mm3      Eosinophils, Absolute 0.01 10*3/mm3      Basophils, Absolute 0.02 10*3/mm3      Immature Grans, Absolute 0.03 10*3/mm3      nRBC 0.0 /100 WBC     Lactic Acid, Plasma [[id number]]  (Normal) Collected: 09/29/23 1236    Specimen: Blood Updated: 09/29/23 1354     Lactate 2.0 mmol/L     Lipid Panel [[id number]]  (Abnormal) Collected: 09/29/23 0901    Specimen: Blood Updated: 09/29/23 1008     Total Cholesterol 18 mg/dL      Triglycerides 20 mg/dL      HDL Cholesterol 5 mg/dL      LDL Cholesterol  <5 mg/dL      VLDL Cholesterol --     Comment: Unable to calculate        LDL/HDL Ratio 1.80    Narrative:      Cholesterol Reference Ranges  (U.S. Department of Health and Human Services ATP III Classifications)    Desirable          <200 mg/dL  Borderline High    200-239 mg/dL  High Risk          >240 mg/dL      Triglyceride Reference Ranges  (U.S. Department of Health and Human Services ATP III Classifications)    Normal           <150 mg/dL  Borderline High  150-199 mg/dL  High             200-499 mg/dL  Very High        >500 mg/dL    HDL Reference Ranges  (U.S. Department of Health and Human Services ATP III Classifications)    Low     <40 mg/dl (major risk factor for CHD)  High    >60 mg/dl ('negative' risk factor for CHD)        LDL Reference Ranges  (U.S. Department of Health and Human Services ATP III Classifications)    Optimal          <100 mg/dL  Near Optimal     100-129 mg/dL  Borderline High  130-159 mg/dL  High             160-189 mg/dL  Very High        >189 mg/dL    Amylase [[id number]]  (Abnormal) Collected: 09/29/23 0901    Specimen: Blood Updated: 09/29/23 1008     Amylase 123 U/L      Urine Drug Screen - Urine, Clean Catch [[id number]]  (Abnormal) Collected: 09/29/23 0900    Specimen: Urine, Clean Catch Updated: 09/29/23 0940     Amphet/Methamphet, Screen Negative     Barbiturates Screen, Urine Negative     Benzodiazepine Screen, Urine Negative     Cocaine Screen, Urine Negative     Opiate Screen Positive     THC, Screen, Urine Positive     Methadone Screen, Urine Negative     Oxycodone Screen, Urine Negative     Fentanyl, Urine Negative    Narrative:      Negative Thresholds Per Drugs Screened:    Amphetamines                 500 ng/ml  Barbiturates                 200 ng/ml  Benzodiazepines              100 ng/ml  Cocaine                      300 ng/ml  Methadone                    300 ng/ml  Opiates                      300 ng/ml  Oxycodone                    100 ng/ml  THC                           50 ng/ml  Fentanyl                       5 ng/ml      The Normal Value for all drugs tested is negative. This report includes final unconfirmed screening results to be used for medical treatment purposes only. Unconfirmed results must not be used for non-medical purposes such as employment or legal testing. Clinical consideration should be applied to any drug of abuse test, particularly when unconfirmed results are used.            Lipase [[id number]]  (Abnormal) Collected: 09/29/23 0901    Specimen: Blood Updated: 09/29/23 0937     Lipase 205 U/L     Urinalysis With Microscopic If Indicated (No Culture) - Urine, Clean Catch [[id number]]  (Abnormal) Collected: 09/29/23 0900    Specimen: Urine, Clean Catch Updated: 09/29/23 0924     Color, UA Yellow     Appearance, UA Clear     pH, UA 8.5     Specific Gravity, UA 1.025     Glucose, UA Negative     Ketones, UA 15 mg/dL (1+)     Bilirubin, UA Negative     Blood, UA Negative     Protein, UA Trace     Leuk Esterase, UA Negative     Nitrite, UA Negative     Urobilinogen, UA 1.0 E.U./dL    Narrative:      Urine microscopic not indicated.    Ethanol  [[id number]] Collected: 09/29/23 0635    Specimen: Blood Updated: 09/29/23 0755     Ethanol <10 mg/dL      Comment: Specimen hemolyzed.  Results may be affected.        Ethanol % <0.010 %     Magnesium [[id number]]  (Normal) Collected: 09/29/23 0635    Specimen: Blood Updated: 09/29/23 0754     Magnesium 2.1 mg/dL     Comprehensive Metabolic Panel [[id number]]  (Abnormal) Collected: 09/29/23 0635    Specimen: Blood Updated: 09/29/23 0754     Glucose 128 mg/dL      BUN 9 mg/dL      Creatinine 1.15 mg/dL      Sodium 136 mmol/L      Potassium 4.9 mmol/L      Comment: Specimen hemolyzed.  Results may be affected.        Chloride 100 mmol/L      CO2 21.0 mmol/L      Calcium 9.6 mg/dL      Total Protein 7.3 g/dL      Albumin 4.2 g/dL      ALT (SGPT) 9 U/L      Comment: Specimen hemolyzed.  Results may be affected.        AST (SGOT) 28 U/L      Comment: Specimen hemolyzed.  Results may be affected.        Alkaline Phosphatase 61 U/L      Total Bilirubin 0.7 mg/dL      Globulin 3.1 gm/dL      A/G Ratio 1.4 g/dL      BUN/Creatinine Ratio 7.8     Anion Gap 15.0 mmol/L      eGFR 82.5 mL/min/1.73     Narrative:      GFR Normal >60  Chronic Kidney Disease <60  Kidney Failure <15      Jenkins Draw [[id number]] Collected: 09/29/23 0635    Specimen: Blood Updated: 09/29/23 0745    Narrative:      The following orders were created for panel order Jenkins Draw.  Procedure                               Abnormality         Status                     ---------                               -----------         ------                     Green Top (Gel)[[id number]]                                   Final result               Lavender Top[[id number]]                                      Final result               Gold Top - SST[[id number]]                                    Final result               Light Blue Top[[id number]]                                    Final result                 Please view results for these tests on the individual orders.     Green Top (Gel) [[id number]] Collected: 09/29/23 0635    Specimen: Blood Updated: 09/29/23 0745     Extra Tube Hold for add-ons.     Comment: Auto resulted.       Lavender Top [[id number]] Collected: 09/29/23 0635    Specimen: Blood Updated: 09/29/23 0745     Extra Tube hold for add-on     Comment: Auto resulted       Gold Top - SST [[id number]] Collected: 09/29/23 0635    Specimen: Blood Updated: 09/29/23 0745     Extra Tube Hold for add-ons.     Comment: Auto resulted.       Light Blue Top [[id number]] Collected: 09/29/23 0635    Specimen: Blood Updated: 09/29/23 0745     Extra Tube Hold for add-ons.     Comment: Auto resulted       CBC & Differential [[id number]]  (Abnormal) Collected: 09/29/23 0635    Specimen: Blood Updated: 09/29/23 0651    Narrative:      The following orders were created for panel order CBC & Differential.  Procedure                               Abnormality         Status                     ---------                               -----------         ------                     CBC Auto Differential[[id number]]        Abnormal            Final result                 Please view results for these tests on the individual orders.    CBC Auto Differential [[id number]]  (Abnormal) Collected: 09/29/23 0635    Specimen: Blood Updated: 09/29/23 0651     WBC 13.44 10*3/mm3      RBC 4.99 10*6/mm3      Hemoglobin 16.6 g/dL      Hematocrit 47.3 %      MCV 94.8 fL      MCH 33.3 pg      MCHC 35.1 g/dL      RDW 13.1 %      RDW-SD 45.3 fl      MPV 11.2 fL      Platelets 186 10*3/mm3      Neutrophil % 75.4 %      Lymphocyte % 15.9 %      Monocyte % 8.0 %      Eosinophil % 0.3 %      Basophil % 0.1 %      Immature Grans % 0.3 %      Neutrophils, Absolute 10.13 10*3/mm3      Lymphocytes, Absolute 2.14 10*3/mm3      Monocytes, Absolute 1.07 10*3/mm3      Eosinophils, Absolute 0.04 10*3/mm3      Basophils, Absolute 0.02 10*3/mm3      Immature Grans, Absolute 0.04 10*3/mm3      nRBC 0.0 /100 WBC           Data  Review:  Results from last 7 days   Lab Units 09/30/23  0522 09/29/23  0635   SODIUM mmol/L 137 136   POTASSIUM mmol/L 3.9 4.9   CHLORIDE mmol/L 102 100   CO2 mmol/L 26.0 21.0*   BUN mg/dL 7 9   CREATININE mg/dL 0.95 1.15   GLUCOSE mg/dL 99 128*   CALCIUM mg/dL 8.8 9.6     Results from last 7 days   Lab Units 09/30/23  0522 09/29/23  0635   WBC 10*3/mm3 14.19* 13.44*   HEMOGLOBIN g/dL 15.6 16.6   HEMATOCRIT % 43.8 47.3   PLATELETS 10*3/mm3 173 186             No results found for: TROPONINT  Results from last 7 days   Lab Units 09/29/23  0901   CHOLESTEROL mg/dL 18   TRIGLYCERIDES mg/dL 20   HDL CHOL mg/dL 5*   LDL CHOL mg/dL <5     Results from last 7 days   Lab Units 09/30/23  0522 09/29/23  0635   ALK PHOS U/L 47 61   BILIRUBIN mg/dL 0.7 0.7   ALT (SGPT) U/L <5 9   AST (SGOT) U/L 15 28             No results found for: POCGLU        History reviewed. No pertinent past medical history.    Assessment:  Active Hospital Problems    Diagnosis  POA    **Acute pancreatitis [K85.90]  Yes    Cigar smoker [F17.290]  Yes    Alcohol abuse [F10.10]  Yes    Tetrahydrocannabinol (THC) use disorder, moderate, dependence [F12.20]  Yes      Resolved Hospital Problems   No resolved problems to display.       Plan:  Continue with IV fluid hydration and pain and nausea medicine.  Bowel rest we will keep n.p.o. for another day.  Follow-up labs.  We will ask for GI consult.  Recommended that he stop drinking.  Pancreatitis most likely due to alcohol.    Kj Andrew MD  9/30/2023  13:31 EDT

## 2023-09-30 NOTE — PLAN OF CARE
9/30/2023 1952 by Bebeto Joyce, RN  Reactivated  Flowsheets (Taken 9/30/2023 1812)  Progress: no change  Plan of Care Reviewed With: patient  Outcome Evaluation: Patient reports mild nausea. His CIWAs ranged from 1 through 3. GI consulted for pancreatitis management. IV fluids and NPO status continued.

## 2023-09-30 NOTE — PLAN OF CARE
Goal Outcome Evaluation:      Pt admitted 9/29/2023 for acute pancreatitis.  Pt complaints abdominal pain , Anxiety and generalized pain . Pt CIWA between 0-8 and given one time Ativan per protocol. No chest pain or shortness of breath . Pt have generalize weakness.  Safety maintained.
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
## 2023-09-30 NOTE — NURSING NOTE
"Attempted [facility] consult with pt for ETOH. Pt appeared alert and requested that consult be completed tomorrow as he had N/V and was fatigued. When asked how much alcohol he drinks, he states \"when I drink, I drink\" indicating he drinks large volumes but does not do it everyday. Verbalized no tremors, headache or sweating. Assured pt [facility] could return tomorrow so he can rest.   "

## 2023-10-01 ENCOUNTER — INPATIENT HOSPITAL (OUTPATIENT)
Dept: URBAN - METROPOLITAN AREA HOSPITAL 113 | Facility: HOSPITAL | Age: 38
End: 2023-10-01

## 2023-10-01 ENCOUNTER — READMISSION MANAGEMENT (OUTPATIENT)
Dept: CALL CENTER | Facility: HOSPITAL | Age: 38
End: 2023-10-01

## 2023-10-01 VITALS
WEIGHT: 173 LBS | HEIGHT: 67 IN | SYSTOLIC BLOOD PRESSURE: 129 MMHG | HEART RATE: 55 BPM | TEMPERATURE: 99 F | DIASTOLIC BLOOD PRESSURE: 82 MMHG | BODY MASS INDEX: 27.15 KG/M2 | OXYGEN SATURATION: 98 % | RESPIRATION RATE: 18 BRPM

## 2023-10-01 DIAGNOSIS — F10.10 ALCOHOL ABUSE, UNCOMPLICATED: ICD-10-CM

## 2023-10-01 DIAGNOSIS — R11.2 NAUSEA WITH VOMITING, UNSPECIFIED: ICD-10-CM

## 2023-10-01 DIAGNOSIS — K85.90 ACUTE PANCREATITIS WITHOUT NECROSIS OR INFECTION, UNSPECIFIE: ICD-10-CM

## 2023-10-01 LAB
ALBUMIN SERPL-MCNC: 3.1 G/DL (ref 3.5–5.2)
ALBUMIN/GLOB SERPL: 1.1 G/DL
ALP SERPL-CCNC: 40 U/L (ref 39–117)
ALT SERPL W P-5'-P-CCNC: 5 U/L (ref 1–41)
ANION GAP SERPL CALCULATED.3IONS-SCNC: 9.8 MMOL/L (ref 5–15)
AST SERPL-CCNC: 13 U/L (ref 1–40)
BASOPHILS # BLD AUTO: 0.01 10*3/MM3 (ref 0–0.2)
BASOPHILS NFR BLD AUTO: 0.1 % (ref 0–1.5)
BILIRUB SERPL-MCNC: 0.9 MG/DL (ref 0–1.2)
BUN SERPL-MCNC: 8 MG/DL (ref 6–20)
BUN/CREAT SERPL: 9 (ref 7–25)
CALCIUM SPEC-SCNC: 8.4 MG/DL (ref 8.6–10.5)
CHLORIDE SERPL-SCNC: 102 MMOL/L (ref 98–107)
CO2 SERPL-SCNC: 23.2 MMOL/L (ref 22–29)
CREAT SERPL-MCNC: 0.89 MG/DL (ref 0.76–1.27)
DEPRECATED RDW RBC AUTO: 45 FL (ref 37–54)
EGFRCR SERPLBLD CKD-EPI 2021: 111.1 ML/MIN/1.73
EOSINOPHIL # BLD AUTO: 0.04 10*3/MM3 (ref 0–0.4)
EOSINOPHIL NFR BLD AUTO: 0.3 % (ref 0.3–6.2)
ERYTHROCYTE [DISTWIDTH] IN BLOOD BY AUTOMATED COUNT: 13.2 % (ref 12.3–15.4)
GLOBULIN UR ELPH-MCNC: 2.8 GM/DL
GLUCOSE SERPL-MCNC: 71 MG/DL (ref 65–99)
HCT VFR BLD AUTO: 38.2 % (ref 37.5–51)
HGB BLD-MCNC: 13.6 G/DL (ref 13–17.7)
IMM GRANULOCYTES # BLD AUTO: 0.04 10*3/MM3 (ref 0–0.05)
IMM GRANULOCYTES NFR BLD AUTO: 0.3 % (ref 0–0.5)
LIPASE SERPL-CCNC: 208 U/L (ref 13–60)
LYMPHOCYTES # BLD AUTO: 1.36 10*3/MM3 (ref 0.7–3.1)
LYMPHOCYTES NFR BLD AUTO: 10 % (ref 19.6–45.3)
MCH RBC QN AUTO: 33.6 PG (ref 26.6–33)
MCHC RBC AUTO-ENTMCNC: 35.6 G/DL (ref 31.5–35.7)
MCV RBC AUTO: 94.3 FL (ref 79–97)
MONOCYTES # BLD AUTO: 1.54 10*3/MM3 (ref 0.1–0.9)
MONOCYTES NFR BLD AUTO: 11.3 % (ref 5–12)
NEUTROPHILS NFR BLD AUTO: 10.67 10*3/MM3 (ref 1.7–7)
NEUTROPHILS NFR BLD AUTO: 78 % (ref 42.7–76)
NRBC BLD AUTO-RTO: 0 /100 WBC (ref 0–0.2)
PLATELET # BLD AUTO: 146 10*3/MM3 (ref 140–450)
PMV BLD AUTO: 11.7 FL (ref 6–12)
POTASSIUM SERPL-SCNC: 3.6 MMOL/L (ref 3.5–5.2)
PROT SERPL-MCNC: 5.9 G/DL (ref 6–8.5)
RBC # BLD AUTO: 4.05 10*6/MM3 (ref 4.14–5.8)
SODIUM SERPL-SCNC: 135 MMOL/L (ref 136–145)
WBC NRBC COR # BLD: 13.66 10*3/MM3 (ref 3.4–10.8)

## 2023-10-01 PROCEDURE — 25010000002 THIAMINE HCL 200 MG/2ML SOLUTION: Performed by: NURSE PRACTITIONER

## 2023-10-01 PROCEDURE — 99233 SBSQ HOSP IP/OBS HIGH 50: CPT | Performed by: NURSE PRACTITIONER

## 2023-10-01 PROCEDURE — 36415 COLL VENOUS BLD VENIPUNCTURE: CPT | Performed by: NURSE PRACTITIONER

## 2023-10-01 PROCEDURE — 85025 COMPLETE CBC W/AUTO DIFF WBC: CPT | Performed by: INTERNAL MEDICINE

## 2023-10-01 PROCEDURE — 80053 COMPREHEN METABOLIC PANEL: CPT | Performed by: NURSE PRACTITIONER

## 2023-10-01 PROCEDURE — 83690 ASSAY OF LIPASE: CPT | Performed by: NURSE PRACTITIONER

## 2023-10-01 RX ORDER — LANOLIN ALCOHOL/MO/W.PET/CERES
100 CREAM (GRAM) TOPICAL DAILY
Qty: 30 TABLET | Refills: 0 | Status: SHIPPED | OUTPATIENT
Start: 2023-10-01

## 2023-10-01 RX ADMIN — CLONIDINE HYDROCHLORIDE 0.1 MG: 0.1 TABLET ORAL at 03:38

## 2023-10-01 RX ADMIN — FOLIC ACID 1 MG: 1 TABLET ORAL at 09:30

## 2023-10-01 RX ADMIN — CLONIDINE HYDROCHLORIDE 0.1 MG: 0.1 TABLET ORAL at 05:50

## 2023-10-01 RX ADMIN — CLONIDINE HYDROCHLORIDE 0.1 MG: 0.1 TABLET ORAL at 12:09

## 2023-10-01 RX ADMIN — THIAMINE HYDROCHLORIDE 200 MG: 100 INJECTION, SOLUTION INTRAMUSCULAR; INTRAVENOUS at 05:49

## 2023-10-01 NOTE — NURSING NOTE
Patient states he prefers to get thiamine over the counter rather than use [facility].

## 2023-10-01 NOTE — PLAN OF CARE
Goal Outcome Evaluation:  Plan of Care Reviewed With: patient           Outcome Evaluation: Vital stable. Sodium chloride 0.9 150 ml/hr continue. NPO . CIWA between 1-8 gets one time 1mg Iv Ativan.Safety maintained.

## 2023-10-01 NOTE — PROGRESS NOTES
LOS: 2 days   Patient Care Team:  Charbel Kaur MD as PCP - General (Family Medicine)      Subjective     Interval History: No new events    Subjective: Patient states he feels better with less pain.  He still has soreness in epigastric area.  Less nausea no vomiting.  He is hungry and wants regular food.  He says he had a black tarry bowel movement this morning but he also took Pepto-Bismol yesterday before coming to the hospital.  Hemoglobin is stable at 13.6.  He has good urinary output.      ROS:   Review of Systems   Gastrointestinal:  Positive for abdominal pain and nausea. Negative for blood in stool, diarrhea and vomiting.   All other systems reviewed and are negative.       Medication Review:     Current Facility-Administered Medications:     cloNIDine (CATAPRES) tablet 0.1 mg, 0.1 mg, Oral, Q6H, Noemy George APRN, 0.1 mg at 10/01/23 0550    folic acid (FOLVITE) tablet 1 mg, 1 mg, Oral, Daily, Noemy George APRN, 1 mg at 10/01/23 0930    LORazepam (ATIVAN) tablet 1 mg, 1 mg, Oral, Q1H PRN **OR** LORazepam (ATIVAN) injection 1 mg, 1 mg, Intravenous, Q1H PRN, 1 mg at 09/30/23 2128 **OR** LORazepam (ATIVAN) tablet 2 mg, 2 mg, Oral, Q1H PRN **OR** LORazepam (ATIVAN) injection 2 mg, 2 mg, Intravenous, Q1H PRN **OR** LORazepam (ATIVAN) injection 2 mg, 2 mg, Intravenous, Q15 Min PRN **OR** LORazepam (ATIVAN) injection 2 mg, 2 mg, Intramuscular, Q15 Min PRN, Noemy George APRN    Magnesium Standard Dose Replacement - Follow Nurse / BPA Driven Protocol, , Does not apply, PRN, Noemy George APRN    scopolamine patch 1 mg/72 hr, 1 patch, Transdermal, Q72H, Veto Salinas MD, 1 patch at 09/30/23 1917    sodium chloride 0.9 % flush 10 mL, 10 mL, Intravenous, PRN, Emergency, Triage Protocol, MD    [COMPLETED] Insert Peripheral IV, , , Once **AND** sodium chloride 0.9 % flush 10 mL, 10 mL, Intravenous, PRN, Dennys George MD    sodium chloride 0.9 % infusion, 150 mL/hr,  Intravenous, Continuous, Noemy George APRN, Last Rate: 150 mL/hr at 09/30/23 1516, 150 mL/hr at 09/30/23 1516    thiamine (B-1) injection 200 mg, 200 mg, Intravenous, Q8H, 200 mg at 10/01/23 0549 **FOLLOWED BY** [START ON 10/4/2023] thiamine (VITAMIN B-1) tablet 100 mg, 100 mg, Oral, Daily, Noemy George APRN      Objective     Vital Signs  Vitals:    09/30/23 1248 09/30/23 1812 09/30/23 2010 09/30/23 2252   BP: 148/97 144/92 150/92 144/95   BP Location: Right arm Right arm Right arm Right arm   Patient Position: Lying Lying Lying Lying   Pulse: 68 74 62 70   Resp: 18  18 18   Temp: 99.1 °F (37.3 °C)  99.5 °F (37.5 °C) 99 °F (37.2 °C)   TempSrc: Oral  Oral Oral   SpO2: 98%  100% 98%   Weight:       Height:           Physical Exam: Ambulating in room    General Appearance:    Awake and alert, in no acute distress   Head:    Normocephalic, without obvious abnormality   Eyes:          Conjunctivae normal, anicteric sclera   Throat:   No oral lesions, no thrush, oral mucosa moist   Neck:   No adenopathy, supple, no JVD   Lungs:     Respirations regular, even and unlabored   Abdomen:     Soft, mild epigastric tenderness, non-distended, no rebound or guarding, no hepatosplenomegaly   Rectal:     Deferred   Extremities:   No edema, no cyanosis, moves equally bilaterally   Skin:   No bruising, no rash, no jaundice        Results Review:    CBC  Results from last 7 days   Lab Units 10/01/23  0826 09/30/23  0522 09/29/23  0635   RBC 10*6/mm3 4.05* 4.67 4.99   WBC 10*3/mm3 13.66* 14.19* 13.44*   HEMOGLOBIN g/dL 13.6 15.6 16.6   PLATELETS 10*3/mm3 146 173 186       CMP  Results from last 7 days   Lab Units 10/01/23  0826 09/30/23  0522 09/29/23  0635   SODIUM mmol/L 135* 137 136   POTASSIUM mmol/L 3.6 3.9 4.9   CHLORIDE mmol/L 102 102 100   CO2 mmol/L 23.2 26.0 21.0*   BUN mg/dL 8 7 9   CREATININE mg/dL 0.89 0.95 1.15   GLUCOSE mg/dL 71 99 128*   ALBUMIN g/dL 3.1* 3.5 4.2   BILIRUBIN mg/dL 0.9 0.7 0.7   ALK PHOS  U/L 40 47 61   AST (SGOT) U/L 13 15 28   ALT (SGPT) U/L 5 <5 9       Amylase and Lipase  Results from last 7 days   Lab Units 10/01/23  0826 09/30/23  0522 09/29/23  0901   AMYLASE U/L  --   --  123*   LIPASE U/L 208* 1,374* 205*       CRP         Imaging Results (Last 24 Hours)       ** No results found for the last 24 hours. **              ASSESSMENT:  38-year-old male with history of alcohol and marijuana use disorder presented with complaint of nausea/vomiting and abdominal pain.  -Acute non-complicated pancreatitis -likely due to alcohol use  -Nausea/vomiting with possible vestibular component since it is worse with movement and improved with scopolamine  -Alcohol abuse, tobacco abuse (cigar smoker)  -Marijuana use -consider hyperemesis cannabinoid      PLAN:  Advance to full liquid diet.  Continue scopolamine.  Alcohol cessation advised to prevent future episodes of pancreatitis.  Tobacco and marijuana cessation also recommended.  [facility] will follow.       NEEMA Williamson  10/01/23  10:37 EDT

## 2023-10-01 NOTE — OUTREACH NOTE
Prep Survey      Flowsheet Row Responses   [facility]? [place]   Is LACE score < 7 ? Yes   Eligibility [facility]   Date of Admission 09/29/23   Date of Discharge 10/01/23   Discharge Disposition Home or Self Care   Discharge diagnosis Acute pancreatitis   Does the patient have one of the following disease processes/diagnoses(primary or secondary)? Other   Does the patient have Home health ordered? No   Is there a DME ordered? No   Medication alerts for this patient see AVS   Prep survey completed? Yes            [place] LOPEZ - Registered Nurse

## 2023-10-02 ENCOUNTER — TRANSITIONAL CARE MANAGEMENT TELEPHONE ENCOUNTER (OUTPATIENT)
Dept: CALL CENTER | Facility: HOSPITAL | Age: 38
End: 2023-10-02

## 2023-10-02 NOTE — OUTREACH NOTE
Call Center TCM Note      Flowsheet Row Responses   [facility] patient discharged from? [place]   Does the patient have one of the following disease processes/diagnoses(primary or secondary)? Other   TCM attempt successful? No   Unsuccessful attempts Attempt 2            Rachelle Davis MA    10/2/2023, 16:41 EDT

## 2023-10-02 NOTE — DISCHARGE SUMMARY
Patient Name: Rome Sanchez  : 1985  MRN: [id number]    Date of Admission: 2023  Date of Discharge:  10/1/2023  Primary Care Physician: Charbel Kaur MD      Chief Complaint:   Abdominal Pain and Tingling      Discharge Diagnoses     Active Hospital Problems    Diagnosis  POA    **Acute pancreatitis [K85.90]  Yes    Cigar smoker [F17.290]  Yes    Alcohol abuse [F10.10]  Yes    Tetrahydrocannabinol (THC) use disorder, moderate, dependence [F12.20]  Yes      Resolved Hospital Problems   No resolved problems to display.        Hospital Course     Mr. Sanchez is a 38 y.o. male with a history of ETOH abuse who presented to [facility] initially complaining of abdominal pain.  Please see the admitting history and physical for further details.  He was found to have probable pancreatitis on labs and imaging and was admitted. He was started on aggressive IVF and kept NPO. He was started on thiamine and MVI with lorazepam per [facility] protocol. He really didn't show significant signs of withdrawal. Diet was advanced this morning and he tolerated it well. He was adamantly wanting discharge this afternoon and had benign exam so was allowed to discharge home. I counseled him on ETOH cessation and he was seen by [facility] as well though he declined resources.        Day of Discharge     Subjective:  Tolerated diet without issue    Physical Exam:  Heart Rate:  [55] 55  BP: (129)/(82) 129/82  Body mass index is 27.1 kg/m².  Physical Exam  Vitals reviewed.   Constitutional:       General: He is not in acute distress.  Cardiovascular:      Rate and Rhythm: Normal rate and regular rhythm.   Pulmonary:      Effort: Pulmonary effort is normal. No respiratory distress.   Abdominal:      General: Bowel sounds are normal.      Palpations: Abdomen is soft.      Tenderness: There is no abdominal tenderness.   Musculoskeletal:      Right lower leg: No edema.      Left lower leg: No edema.   Skin:     General:  Skin is warm and dry.   Neurological:      Mental Status: He is alert and oriented to person, place, and time.       Consultants     Consult Orders (all) (From admission, onward)       Start     Ordered    09/30/23 1333  Inpatient Gastroenterology Consult  Once        Specialty:  Gastroenterology  Provider:  Veto Salinas MD    09/30/23 1333    09/29/23 0940  Inpatient consult to [facility]  Once        Provider:  (Not yet assigned)    09/29/23 0941    09/29/23 0858  [facility] (on-call MD unless specified) Details  Once,   Status:  Canceled        Specialty:  Hospitalist  Provider:  (Not yet assigned)    09/29/23 0857                  Procedures       Imaging Results (All)       Procedure Component Value Units Date/Time    CT Abdomen Pelvis With Contrast [[id number]] Collected: 09/29/23 0838     Updated: 09/29/23 0853    Narrative:      CT ABDOMEN PELVIS W CONTRAST-     INDICATIONS: Pain     TECHNIQUE: Radiation dose reduction techniques were utilized, including  automated exposure control and exposure modulation based on body size.  Enhanced ABDOMEN AND PELVIS CT     COMPARISON: None available     FINDINGS:     Peripancreatic fluid and inflammatory stranding is seen, compatible with  acute pancreatitis. No definite pancreatic necrosis is identified,  follow-up as indications persist.     Left renal cysts and right renal low-density too small to characterize  are noted.     Otherwise unremarkable appearance of the liver, gallbladder, spleen,  adrenal glands, pancreas, kidneys, bladder.     No bowel obstruction or abnormal bowel thickening is identified.     No free intraperitoneal gas. Small pelvic free fluid is present.     Scattered small mesenteric and para-aortic lymph nodes are seen that are  not significant by size criteria.     Abdominal aorta is not aneurysmal.      The lung bases show small atelectasis. The heart appears mildly  enlarged.     Degenerative changes are seen in the spine. No acute fracture  is  identified.             Impression:         1. Findings compatible with acute pancreatitis, correlate clinically,  follow-up as indications persist.     2. No acute inflammatory process of bowel is identified.     3. No obstructive uropathy.     This report was finalized on 9/29/2023 8:49 AM by Dr. Rome Singh M.D.                 Pertinent Labs     Results from last 7 days   Lab Units 10/01/23  0826 09/30/23 0522 09/29/23  0635   WBC 10*3/mm3 13.66* 14.19* 13.44*   HEMOGLOBIN g/dL 13.6 15.6 16.6   PLATELETS 10*3/mm3 146 173 186     Results from last 7 days   Lab Units 10/01/23  0826 09/30/23  0522 09/29/23  0635   SODIUM mmol/L 135* 137 136   POTASSIUM mmol/L 3.6 3.9 4.9   CHLORIDE mmol/L 102 102 100   CO2 mmol/L 23.2 26.0 21.0*   BUN mg/dL 8 7 9   CREATININE mg/dL 0.89 0.95 1.15   GLUCOSE mg/dL 71 99 128*   EGFR mL/min/1.73 111.1 103.8 82.5     Results from last 7 days   Lab Units 10/01/23  0826 09/30/23  0522 09/29/23  0635   ALBUMIN g/dL 3.1* 3.5 4.2   BILIRUBIN mg/dL 0.9 0.7 0.7   ALK PHOS U/L 40 47 61   AST (SGOT) U/L 13 15 28   ALT (SGPT) U/L 5 <5 9     Results from last 7 days   Lab Units 10/01/23  0826 09/30/23  0522 09/29/23  0635   CALCIUM mg/dL 8.4* 8.8 9.6   ALBUMIN g/dL 3.1* 3.5 4.2   MAGNESIUM mg/dL  --   --  2.1     Results from last 7 days   Lab Units 10/01/23  0826 09/30/23  0522 09/29/23  0901   AMYLASE U/L  --   --  123*   LIPASE U/L 208* 1,374* 205*         Results from last 7 days   Lab Units 09/29/23  0901   CHOLESTEROL mg/dL 18   TRIGLYCERIDES mg/dL 20   HDL CHOL mg/dL 5*   LDL CHOL mg/dL <5     Results from last 7 days   Lab Units 09/29/23  1252 09/29/23  1236   BLOODCX  No growth at 2 days No growth at 2 days         Test Results Pending at Discharge     Pending Labs       Order Current Status    Blood Culture - Blood, Arm, Right Preliminary result    Blood Culture - Blood, Hand, Left Preliminary result            Discharge Details        Discharge Medications        New  Medications        Instructions Start Date   thiamine 100 MG tablet  Commonly known as: VITAMIN B1   100 mg, Oral, Daily             Stop These Medications      ibuprofen 800 MG tablet  Commonly known as: ADVIL,MOTRIN              No Known Allergies    Discharge Disposition:  Home or Self Care      Discharge Diet:  No active diet order      Discharge Activity:       CODE STATUS:    There are no questions and answers to display.       No future appointments.  Additional Instructions for the Follow-ups that You Need to Schedule       Discharge Follow-up with PCP   As directed       Currently Documented PCP:    Charbel Kaur MD    PCP Phone Number:    [phone]     Follow Up Details: 1 week               Follow-up Information       Charbel Kaur MD .    Specialty: Family Medicine  Why: 1 week  Contact information:  [address]  [place] [address]  [phone]                             Additional Instructions for the Follow-ups that You Need to Schedule       Discharge Follow-up with PCP   As directed       Currently Documented PCP:    Charbel Kaur MD    PCP Phone Number:    [phone]     Follow Up Details: 1 week            Time Spent on Discharge:  Greater than 30 minutes      Juan Selby MD  [facility]  10/01/23  23:26 EDT

## 2023-10-02 NOTE — CASE MANAGEMENT/SOCIAL WORK
Case Management Discharge Note      Final Note: Home         Selected Continued Care - Discharged on 10/1/2023 Admission date: 9/29/2023 - Discharge disposition: Home or Self Care      Destination    No services have been selected for the patient.                Durable Medical Equipment    No services have been selected for the patient.                Dialysis/Infusion    No services have been selected for the patient.                Home Medical Care    No services have been selected for the patient.                Therapy    No services have been selected for the patient.                Community Resources    No services have been selected for the patient.                Community & DME    No services have been selected for the patient.                         Final Discharge Disposition Code: 01 - home or self-care

## 2023-10-03 ENCOUNTER — TRANSITIONAL CARE MANAGEMENT TELEPHONE ENCOUNTER (OUTPATIENT)
Dept: CALL CENTER | Facility: HOSPITAL | Age: 38
End: 2023-10-03

## 2023-10-03 NOTE — OUTREACH NOTE
Call Center TCM Note      Flowsheet Row Responses   [facility] patient discharged from? [place]   Does the patient have one of the following disease processes/diagnoses(primary or secondary)? Other   TCM attempt successful? No   Unsuccessful attempts Attempt 3   Wrap up additional comments D/C DX: Acute pancreatitis - Unable to reach pt x 3 attempts for TCM call. Pt is not yet sched with PCP Dr Kaur for TCM APPT. d/c date from [facility] was 10/01/2023.            Rachelle Davis MA    10/3/2023, 11:39 EDT

## 2023-10-04 LAB
BACTERIA SPEC AEROBE CULT: NORMAL
BACTERIA SPEC AEROBE CULT: NORMAL